# Patient Record
Sex: FEMALE | Race: ASIAN | NOT HISPANIC OR LATINO | URBAN - METROPOLITAN AREA
[De-identification: names, ages, dates, MRNs, and addresses within clinical notes are randomized per-mention and may not be internally consistent; named-entity substitution may affect disease eponyms.]

---

## 2018-10-01 ENCOUNTER — EMERGENCY (EMERGENCY)
Facility: HOSPITAL | Age: 63
LOS: 1 days | Discharge: HOME | End: 2018-10-01
Attending: EMERGENCY MEDICINE
Payer: MEDICAID

## 2018-10-01 VITALS
RESPIRATION RATE: 14 BRPM | DIASTOLIC BLOOD PRESSURE: 93 MMHG | TEMPERATURE: 98 F | SYSTOLIC BLOOD PRESSURE: 141 MMHG | OXYGEN SATURATION: 98 % | HEART RATE: 72 BPM

## 2018-10-01 VITALS
HEART RATE: 59 BPM | SYSTOLIC BLOOD PRESSURE: 98 MMHG | RESPIRATION RATE: 17 BRPM | TEMPERATURE: 98 F | OXYGEN SATURATION: 100 % | DIASTOLIC BLOOD PRESSURE: 66 MMHG

## 2018-10-01 DIAGNOSIS — N83.202 UNSPECIFIED OVARIAN CYST, LEFT SIDE: ICD-10-CM

## 2018-10-01 LAB
ALBUMIN SERPL ELPH-MCNC: 4 G/DL — SIGNIFICANT CHANGE UP (ref 3.3–5)
ALP SERPL-CCNC: 74 U/L — SIGNIFICANT CHANGE UP (ref 40–120)
ALT FLD-CCNC: 17 U/L — SIGNIFICANT CHANGE UP (ref 10–45)
ANION GAP SERPL CALC-SCNC: 10 MMOL/L — SIGNIFICANT CHANGE UP (ref 5–17)
APPEARANCE UR: CLEAR — SIGNIFICANT CHANGE UP
AST SERPL-CCNC: 18 U/L — SIGNIFICANT CHANGE UP (ref 10–40)
BACTERIA # UR AUTO: NEGATIVE — SIGNIFICANT CHANGE UP
BASOPHILS # BLD AUTO: 0 K/UL — SIGNIFICANT CHANGE UP (ref 0–0.2)
BASOPHILS NFR BLD AUTO: 0.8 % — SIGNIFICANT CHANGE UP (ref 0–2)
BILIRUB SERPL-MCNC: 0.4 MG/DL — SIGNIFICANT CHANGE UP (ref 0.2–1.2)
BILIRUB UR-MCNC: NEGATIVE — SIGNIFICANT CHANGE UP
BUN SERPL-MCNC: 18 MG/DL — SIGNIFICANT CHANGE UP (ref 7–23)
CALCIUM SERPL-MCNC: 9.3 MG/DL — SIGNIFICANT CHANGE UP (ref 8.4–10.5)
CHLORIDE SERPL-SCNC: 105 MMOL/L — SIGNIFICANT CHANGE UP (ref 96–108)
CO2 SERPL-SCNC: 26 MMOL/L — SIGNIFICANT CHANGE UP (ref 22–31)
COLOR SPEC: COLORLESS — SIGNIFICANT CHANGE UP
CREAT SERPL-MCNC: 0.68 MG/DL — SIGNIFICANT CHANGE UP (ref 0.5–1.3)
DIFF PNL FLD: NEGATIVE — SIGNIFICANT CHANGE UP
EOSINOPHIL # BLD AUTO: 0.1 K/UL — SIGNIFICANT CHANGE UP (ref 0–0.5)
EOSINOPHIL NFR BLD AUTO: 2.8 % — SIGNIFICANT CHANGE UP (ref 0–6)
EPI CELLS # UR: 3 /HPF — SIGNIFICANT CHANGE UP
GLUCOSE SERPL-MCNC: 118 MG/DL — HIGH (ref 70–99)
GLUCOSE UR QL: NEGATIVE — SIGNIFICANT CHANGE UP
HCT VFR BLD CALC: 41.5 % — SIGNIFICANT CHANGE UP (ref 34.5–45)
HGB BLD-MCNC: 13.7 G/DL — SIGNIFICANT CHANGE UP (ref 11.5–15.5)
HYALINE CASTS # UR AUTO: 0 /LPF — SIGNIFICANT CHANGE UP (ref 0–2)
KETONES UR-MCNC: NEGATIVE — SIGNIFICANT CHANGE UP
LEUKOCYTE ESTERASE UR-ACNC: ABNORMAL
LIDOCAIN IGE QN: 42 U/L — SIGNIFICANT CHANGE UP (ref 7–60)
LYMPHOCYTES # BLD AUTO: 2 K/UL — SIGNIFICANT CHANGE UP (ref 1–3.3)
LYMPHOCYTES # BLD AUTO: 40 % — SIGNIFICANT CHANGE UP (ref 13–44)
MCHC RBC-ENTMCNC: 31.8 PG — SIGNIFICANT CHANGE UP (ref 27–34)
MCHC RBC-ENTMCNC: 33.1 GM/DL — SIGNIFICANT CHANGE UP (ref 32–36)
MCV RBC AUTO: 96.1 FL — SIGNIFICANT CHANGE UP (ref 80–100)
MONOCYTES # BLD AUTO: 0.4 K/UL — SIGNIFICANT CHANGE UP (ref 0–0.9)
MONOCYTES NFR BLD AUTO: 8.2 % — SIGNIFICANT CHANGE UP (ref 2–14)
NEUTROPHILS # BLD AUTO: 2.5 K/UL — SIGNIFICANT CHANGE UP (ref 1.8–7.4)
NEUTROPHILS NFR BLD AUTO: 48.2 % — SIGNIFICANT CHANGE UP (ref 43–77)
NITRITE UR-MCNC: NEGATIVE — SIGNIFICANT CHANGE UP
PH UR: 7 — SIGNIFICANT CHANGE UP (ref 5–8)
PLATELET # BLD AUTO: 223 K/UL — SIGNIFICANT CHANGE UP (ref 150–400)
POTASSIUM SERPL-MCNC: 3.9 MMOL/L — SIGNIFICANT CHANGE UP (ref 3.5–5.3)
POTASSIUM SERPL-SCNC: 3.9 MMOL/L — SIGNIFICANT CHANGE UP (ref 3.5–5.3)
PROT SERPL-MCNC: 7.1 G/DL — SIGNIFICANT CHANGE UP (ref 6–8.3)
PROT UR-MCNC: NEGATIVE — SIGNIFICANT CHANGE UP
RBC # BLD: 4.32 M/UL — SIGNIFICANT CHANGE UP (ref 3.8–5.2)
RBC # FLD: 12 % — SIGNIFICANT CHANGE UP (ref 10.3–14.5)
RBC CASTS # UR COMP ASSIST: 2 /HPF — SIGNIFICANT CHANGE UP (ref 0–4)
SODIUM SERPL-SCNC: 141 MMOL/L — SIGNIFICANT CHANGE UP (ref 135–145)
SP GR SPEC: 1.01 — SIGNIFICANT CHANGE UP (ref 1.01–1.02)
TROPONIN T, HIGH SENSITIVITY RESULT: <6 NG/L — SIGNIFICANT CHANGE UP (ref 0–51)
UROBILINOGEN FLD QL: NEGATIVE — SIGNIFICANT CHANGE UP
WBC # BLD: 5.1 K/UL — SIGNIFICANT CHANGE UP (ref 3.8–10.5)
WBC # FLD AUTO: 5.1 K/UL — SIGNIFICANT CHANGE UP (ref 3.8–10.5)
WBC UR QL: 13 /HPF — HIGH (ref 0–5)

## 2018-10-01 PROCEDURE — 93005 ELECTROCARDIOGRAM TRACING: CPT

## 2018-10-01 PROCEDURE — 93975 VASCULAR STUDY: CPT

## 2018-10-01 PROCEDURE — 81001 URINALYSIS AUTO W/SCOPE: CPT

## 2018-10-01 PROCEDURE — 76856 US EXAM PELVIC COMPLETE: CPT

## 2018-10-01 PROCEDURE — 74176 CT ABD & PELVIS W/O CONTRAST: CPT | Mod: 26

## 2018-10-01 PROCEDURE — 93975 VASCULAR STUDY: CPT | Mod: 26

## 2018-10-01 PROCEDURE — 80053 COMPREHEN METABOLIC PANEL: CPT

## 2018-10-01 PROCEDURE — 76856 US EXAM PELVIC COMPLETE: CPT | Mod: 26,59

## 2018-10-01 PROCEDURE — 99284 EMERGENCY DEPT VISIT MOD MDM: CPT | Mod: 25

## 2018-10-01 PROCEDURE — 76830 TRANSVAGINAL US NON-OB: CPT | Mod: 26

## 2018-10-01 PROCEDURE — 85027 COMPLETE CBC AUTOMATED: CPT

## 2018-10-01 PROCEDURE — 84484 ASSAY OF TROPONIN QUANT: CPT

## 2018-10-01 PROCEDURE — 93010 ELECTROCARDIOGRAM REPORT: CPT

## 2018-10-01 PROCEDURE — 74176 CT ABD & PELVIS W/O CONTRAST: CPT

## 2018-10-01 PROCEDURE — 83690 ASSAY OF LIPASE: CPT

## 2018-10-01 PROCEDURE — 96374 THER/PROPH/DIAG INJ IV PUSH: CPT

## 2018-10-01 PROCEDURE — 96375 TX/PRO/DX INJ NEW DRUG ADDON: CPT

## 2018-10-01 RX ORDER — ONDANSETRON 8 MG/1
4 TABLET, FILM COATED ORAL ONCE
Qty: 0 | Refills: 0 | Status: COMPLETED | OUTPATIENT
Start: 2018-10-01 | End: 2018-10-01

## 2018-10-01 RX ORDER — SODIUM CHLORIDE 9 MG/ML
1000 INJECTION INTRAMUSCULAR; INTRAVENOUS; SUBCUTANEOUS ONCE
Qty: 0 | Refills: 0 | Status: COMPLETED | OUTPATIENT
Start: 2018-10-01 | End: 2018-10-01

## 2018-10-01 RX ORDER — KETOROLAC TROMETHAMINE 30 MG/ML
30 SYRINGE (ML) INJECTION ONCE
Qty: 0 | Refills: 0 | Status: DISCONTINUED | OUTPATIENT
Start: 2018-10-01 | End: 2018-10-01

## 2018-10-01 RX ORDER — ACETAMINOPHEN 500 MG
1000 TABLET ORAL ONCE
Qty: 0 | Refills: 0 | Status: COMPLETED | OUTPATIENT
Start: 2018-10-01 | End: 2018-10-01

## 2018-10-01 RX ADMIN — Medication 1000 MILLIGRAM(S): at 06:15

## 2018-10-01 RX ADMIN — ONDANSETRON 4 MILLIGRAM(S): 8 TABLET, FILM COATED ORAL at 05:50

## 2018-10-01 RX ADMIN — SODIUM CHLORIDE 1000 MILLILITER(S): 9 INJECTION INTRAMUSCULAR; INTRAVENOUS; SUBCUTANEOUS at 06:18

## 2018-10-01 RX ADMIN — Medication 1000 MILLIGRAM(S): at 05:55

## 2018-10-01 RX ADMIN — Medication 30 MILLIGRAM(S): at 13:11

## 2018-10-01 NOTE — ED PROVIDER NOTE - NSFOLLOWUPINSTRUCTIONS_ED_ALL_ED_FT
Follow-up with your Primary Care Physician within 24-48 hours.  Please return to the Emergency Department immediately for any new, worsening or concerning symptoms; specifically those included in the attached information brochure.  Copy of your lab work, imaging and/or other testing performed in the ER is attached along with your discharge paperwork.  Please take this to your Primary Care Physician for further discussion, evaluation and comparison with your prior blood work results.     Can use Tylenol (up to 1000mg every 6 hours) or Ibuprofen (up to 600mg every 6 hours) as per package directions for pain - use only as needed.  These are over-the-counter medications - please respect the warnings on the label.

## 2018-10-01 NOTE — ED PROVIDER NOTE - PHYSICAL EXAMINATION
GEN: Well appearing, well nourished, in no apparent distress, comfortable in bed   HEAD: NCAT  HEENT: PERRL, Airway patent, MMM,  neck supple, no LAD  LUNG: CTAB, no adventitious sounds, no retractions, no nasal flaring  CV: RRR, no murmurs,   Abd: soft, NTND, no rebound or guarding, BS+ in all quadrants, no CVAT  MSK: WWP, Pulses 2+ in extremities, No edema, no visible deformities  Neuro:  CN II-XII in tact. AAOx3, Ambulatory with stable gait.   Skin: Warm and dry, no evidence of rash  Psych: normal mood and affect GEN: Well appearing, well nourished, in no apparent distress, comfortable in bed   HEAD: NCAT  HEENT: PERRL, Airway patent, MMM,  neck supple, no LAD  LUNG: CTAB, no adventitious sounds, no retractions, no nasal flaring  CV: RRR, no murmurs,   Abd: soft, no pulsating abdominal mass, TTP LLQ, no rebound or guarding, BS+ in all quadrants, CVAT on L flank, neg gallagher   MSK: WWP, Pulses 2+ in extremities, No edema, no visible deformities  Neuro:  CN II-XII in tact. AAOx3, Ambulatory with stable gait. neg straight leg test  Skin: Warm and dry, no evidence of rash  Psych: normal mood and affect

## 2018-10-01 NOTE — ED PROVIDER NOTE - PROGRESS NOTE DETAILS
CTAP shows a 6 x 4.3 x 4.8 cm left ovarian cyst. Clinical correlation for left ovarian   torsion is recommended. US pelvic ordered to r/o torsion. CTAP shows a 6 x 4.3 x 4.8 cm left ovarian cyst. Clinical correlation for left ovarian   torsion is recommended. US pelvic ordered to r/o torsion. Explained to pt via video mandarin  : Pippa [id number] **ATTENDING ADDENDUM (Dr. Vin Spann): patient serially evaluated throughout ED course by ED team. Awaiting final recommendations of OB-GYN team (likely discharge home with close outpatient followup for ovarian cyst). NO evidence of acute torsion at this time. Anticipatory guidance provided by ED team throughout ED course. **ATTENDING ADDENDUM (Dr. Vin Spann): patient serially evaluated throughout ED course by ED team. ED diagnostics up to this time acknowledged, reviewed and noted. Recommendations by OB-GYN reviewed. Agree with discharge home with close outpatient followup with primary care physician/provider.  Anticipatory guidance provided by ED team.

## 2018-10-01 NOTE — CONSULT NOTE ADULT - SUBJECTIVE AND OBJECTIVE BOX
Gyn Consult Note  GAUDENCIO CASTLE  63y  Female [id number]    HPI: 64y/o  Mandarin speaking female presented to ED with back pain. CT with ovarian cyst. GYN consulted for r/o ovarian torsion.     History obtained from patient using [employer],  #[id number].    LMP: 7-8 years ago    Patient reports new onset back/flank pain and abdominal bloating x2 days. Pt originally though it was 2/2 exercise regimen. Took two Aleve last night prior to sleeping, woke up in pain, decided to come to hospital. Pain is worse with standing, difficult to walk to times. Pain worse with deep inspiration. No active vaginal bleeding or discharge though patient reports infrequent vaginal bleeding after intercourse. Last BM 2 days ago. Intermittent constipation. No diarrhea. No urinary symptoms.     POB: C/S x1,  x3    Pgyn: Has not seen gynecologist since moving to [place] 1 [place] ago. Hx fibroids, (possible myomectomy), denies cysts, abnormal pap smears, possible STI (unknown type) 8 years ago, presented with itchiness, treated    Home meds: allergy pill QD    Allergies: No Known Allergies    PAST MEDICAL & SURGICAL HISTORY:  Hx of biliary colic  C/S x1, possibly myomectomy    Vital Signs Last 24 Hrs  T(C): 36.5 (01 Oct 2018 10:03), Max: 36.7 (01 Oct 2018 03:45)  T(F): 97.7 (01 Oct 2018 10:03), Max: 98.1 (01 Oct 2018 03:45)  HR: 54 (01 Oct 2018 10:03) (52 - 72)  BP: 102/68 (01 Oct 2018 10:03) (97/62 - 141/93)  BP(mean): --  RR: 17 (01 Oct 2018 10:03) (14 - 18)  SpO2: 97% (01 Oct 2018 10:03) (96% - 98%)    Physical Exam:   General: sitting comfortably in bed, NAD   CV: RRR  Lungs: CTAB   Abd: Soft, non-tender, non-distended.  Bowel sounds present.    :  No bleeding. External labia wnl  Bimanual exam with no cervical motion tenderness, uterus wnl, adnexa non palpable b/l    LABS:                            13.7   5.1   )-----------( 223      ( 01 Oct 2018 06:02 )             41.5     10-01    141  |  105  |  18  ----------------------------<  118<H>  3.9   |  26  |  0.68    Ca    9.3      01 Oct 2018 06:02    TPro  7.1  /  Alb  4.0  /  TBili  0.4  /  DBili  x   /  AST  18  /  ALT  17  /  AlkPhos  74  10-01    I&O's Detail      Urinalysis Basic - ( 01 Oct 2018 11:53 )    Color: Colorless / Appearance: Clear / S.010 / pH: x  Gluc: x / Ketone: Negative  / Bili: Negative / Urobili: Negative   Blood: x / Protein: Negative / Nitrite: Negative   Leuk Esterase: Large / RBC: 2 /hpf / WBC 13 /hpf   Sq Epi: x / Non Sq Epi: 3 /hpf / Bacteria: Negative        RADIOLOGY & ADDITIONAL STUDIES:      EXAM:  CT ABDOMEN AND PELVIS                        PROCEDURE DATE:  10/01/2018      FINDINGS:  LOWER CHEST: Bibasilar subsegmental atelectasis.  LIVER: Within normal limits.  BILE DUCTS: Normal caliber.  GALLBLADDER: Within normal limits.  SPLEEN: Within normal limits.  PANCREAS: Within normal limits.  ADRENALS: Within normal limits.  KIDNEYS/URETERS: Within normal limits.  BLADDER: Within normal limits.  REPRODUCTIVE ORGANS: 6 x 4.3 x 4.8 cm left ovarian cyst. The right ovary   and uterus are unremarkable.  BOWEL: No bowel obstruction. The appendix is not distinctly visualized.  PERITONEUM: No pneumoperitoneum.  VESSELS:  Within normal limits.  RETROPERITONEUM: No lymphadenopathy.    ABDOMINAL WALL: Within normal limits.  BONES: Mild degenerative changes of the spine.    IMPRESSION:  6 x 4.3 x 4.8 cm left ovarian cyst. Clinical correlation for left ovarian   torsion is recommended.    EXAM:  US PELVIC COMPLETE                        EXAM:  US DPLX PELVIC                        PROCEDURE DATE:  10/01/2018    INTERPRETATION:  CLINICAL INFORMATION: Left lower quadrant pain.  LMP: Postmenopausal.  COMPARISON: CT abdomen and pelvis 10/1/2018.    FINDINGS:  Uterus: The uterus is retroflexed and measures 4.1x 2.6 x 3.3 cm. Within   normal limits.  Endometrium: 4 mm. Within normal limits.  Right ovary: 1.3 x 0.9 x 0.8 cm. Within normal limits. Arterial and   venous Doppler waveforms are visualized.  Left ovary: Left ovary measures 1.8 x 1.8 x 1.0 cm and contains a simple   appearing cyst measuring 6.4 x 4.7 x 5.2 cm. Within normal limits.   Arterial and venous Doppler waveforms are visualized.  Fluid: None.    IMPRESSION:  Simple left ovarian cyst measuring 6.4 cm. Follow-up sonogram in one year   is recommended to assess for stability. No sonographic evidence of ovarian  torsion at the time of this examination. Gyn Consult Note  GAUDENCIO CASTLE  63y  Female [id number]    HPI: 62y/o  Mandarin speaking female presented to ED with back pain. CT with ovarian cyst. GYN consulted for r/o ovarian torsion.     History obtained from patient using [employer],  #[id number].    LMP: 7-8 years ago    Patient reports new onset bilateral back/flank pain and abdominal bloating x2 days. Pt originally though it was 2/2 exercise regimen. Took two Aleve last night prior to sleeping, woke up in pain, decided to come to hospital. Patient denies abdominal pain. Back pain is worse with standing, difficult to walk to times. Pain worse with deep inspiration. No active vaginal bleeding or discharge though patient reports infrequent vaginal bleeding after intercourse. Last BM 2 days ago. Intermittent constipation. No diarrhea. No urinary symptoms.     POB: C/S x1,  x3    Pgyn: Has not seen gynecologist since moving to [place] 1 [place] ago. Hx fibroids, (possible myomectomy), denies cysts, abnormal pap smears, possible STI (unknown type) 8 years ago, presented with itchiness, treated    Home meds: allergy pill QD    Allergies: No Known Allergies    PAST MEDICAL & SURGICAL HISTORY:  Hx of biliary colic  C/S x1, possibly myomectomy    Vital Signs Last 24 Hrs  T(C): 36.5 (01 Oct 2018 10:03), Max: 36.7 (01 Oct 2018 03:45)  T(F): 97.7 (01 Oct 2018 10:03), Max: 98.1 (01 Oct 2018 03:45)  HR: 54 (01 Oct 2018 10:03) (52 - 72)  BP: 102/68 (01 Oct 2018 10:03) (97/62 - 141/93)  BP(mean): --  RR: 17 (01 Oct 2018 10:03) (14 - 18)  SpO2: 97% (01 Oct 2018 10:03) (96% - 98%)    Physical Exam:   Gen: sitting comfortably in bed, NAD   Cardio: RRR  Pulm: CTAB   Abd: Soft, non-tender, non-distended.  Bowel sounds present.    :  No bleeding. External labia wnl. Bimanual exam with no cervical motion tenderness, uterus wnl, adnexa non palpable b/l    LABS:                 13.7   5.1   )-----------( 223      ( 01 Oct 2018 06:02 )             41.5     10-01    141  |  105  |  18  ----------------------------<  118<H>  3.9   |  26  |  0.68    Ca    9.3      01 Oct 2018 06:02    TPro  7.1  /  Alb  4.0  /  TBili  0.4  /  DBili  x   /  AST  18  /  ALT  17  /  AlkPhos  74  10-01    I&O's Detail      Urinalysis Basic - ( 01 Oct 2018 11:53 )    Color: Colorless / Appearance: Clear / S.010 / pH: x  Gluc: x / Ketone: Negative  / Bili: Negative / Urobili: Negative   Blood: x / Protein: Negative / Nitrite: Negative   Leuk Esterase: Large / RBC: 2 /hpf / WBC 13 /hpf   Sq Epi: x / Non Sq Epi: 3 /hpf / Bacteria: Negative    RADIOLOGY & ADDITIONAL STUDIES:      EXAM:  CT ABDOMEN AND PELVIS                        PROCEDURE DATE:  10/01/2018      FINDINGS:  LOWER CHEST: Bibasilar subsegmental atelectasis.  LIVER: Within normal limits.  BILE DUCTS: Normal caliber.  GALLBLADDER: Within normal limits.  SPLEEN: Within normal limits.  PANCREAS: Within normal limits.  ADRENALS: Within normal limits.  KIDNEYS/URETERS: Within normal limits.  BLADDER: Within normal limits.  REPRODUCTIVE ORGANS: 6 x 4.3 x 4.8 cm left ovarian cyst. The right ovary   and uterus are unremarkable.  BOWEL: No bowel obstruction. The appendix is not distinctly visualized.  PERITONEUM: No pneumoperitoneum.  VESSELS:  Within normal limits.  RETROPERITONEUM: No lymphadenopathy.    ABDOMINAL WALL: Within normal limits.  BONES: Mild degenerative changes of the spine.    IMPRESSION:  6 x 4.3 x 4.8 cm left ovarian cyst. Clinical correlation for left ovarian   torsion is recommended.    EXAM:  US PELVIC COMPLETE                        EXAM:  US DPLX PELVIC                        PROCEDURE DATE:  10/01/2018    INTERPRETATION:  CLINICAL INFORMATION: Left lower quadrant pain.  LMP: Postmenopausal.  COMPARISON: CT abdomen and pelvis 10/1/2018.    FINDINGS:  Uterus: The uterus is retroflexed and measures 4.1x 2.6 x 3.3 cm. Within   normal limits.  Endometrium: 4 mm. Within normal limits.  Right ovary: 1.3 x 0.9 x 0.8 cm. Within normal limits. Arterial and   venous Doppler waveforms are visualized.  Left ovary: Left ovary measures 1.8 x 1.8 x 1.0 cm and contains a simple   appearing cyst measuring 6.4 x 4.7 x 5.2 cm. Within normal limits.   Arterial and venous Doppler waveforms are visualized.  Fluid: None.    IMPRESSION:  Simple left ovarian cyst measuring 6.4 cm. Follow-up sonogram in one year   is recommended to assess for stability. No sonographic evidence of ovarian  torsion at the time of this examination. Gyn Consult Note  GAUDENCIO CASTLE  63y  Female [id number]    HPI: 62y/o  Mandarin speaking female presented to ED with back pain. CT with ovarian cyst. GYN consulted for r/o ovarian torsion.     History obtained from patient using [employer],  #[id number].    LMP: 7-8 years ago    Patient reports new onset back pain and abdominal bloating x2 days. Pt originally though it was 2/2 exercise regimen. Took two Aleve last night prior to sleeping, woke up in pain, decided to come to hospital. Patient denies abdominal pain. Back pain is worse with standing, difficult to walk to times. Pain worse with deep inspiration. No active vaginal bleeding or discharge though patient reports infrequent vaginal bleeding after intercourse. Last BM 2 days ago. Intermittent constipation. No diarrhea. No urinary symptoms.     POB: C/S x1,  x3    Pgyn: Has not seen gynecologist since moving 1 year ago. Hx fibroids, (possible myomectomy), denies cysts, abnormal pap smears, possible STI (unknown type) 8 years ago, presented with itchiness, treated    Home meds: allergy pill QD    Allergies: No Known Allergies    PAST MEDICAL & SURGICAL HISTORY:  Hx of biliary colic  C/S x1, possibly myomectomy    Vital Signs Last 24 Hrs  T(C): 36.5 (01 Oct 2018 10:03), Max: 36.7 (01 Oct 2018 03:45)  T(F): 97.7 (01 Oct 2018 10:03), Max: 98.1 (01 Oct 2018 03:45)  HR: 54 (01 Oct 2018 10:03) (52 - 72)  BP: 102/68 (01 Oct 2018 10:03) (97/62 - 141/93)  BP(mean): --  RR: 17 (01 Oct 2018 10:03) (14 - 18)  SpO2: 97% (01 Oct 2018 10:03) (96% - 98%)    Physical Exam:   Gen: sitting comfortably in bed, NAD   Cardio: RRR  Pulm: CTAB   Abd: Soft, non-tender, non-distended.  Bowel sounds present.    :  No bleeding. External labia wnl. Bimanual exam with no cervical motion tenderness, uterus wnl, adnexa non palpable b/l    LABS:                 13.7   5.1   )-----------( 223      ( 01 Oct 2018 06:02 )             41.5     10-01    141  |  105  |  18  ----------------------------<  118<H>  3.9   |  26  |  0.68    Ca    9.3      01 Oct 2018 06:02    TPro  7.1  /  Alb  4.0  /  TBili  0.4  /  DBili  x   /  AST  18  /  ALT  17  /  AlkPhos  74  10-01    I&O's Detail      Urinalysis Basic - ( 01 Oct 2018 11:53 )    Color: Colorless / Appearance: Clear / S.010 / pH: x  Gluc: x / Ketone: Negative  / Bili: Negative / Urobili: Negative   Blood: x / Protein: Negative / Nitrite: Negative   Leuk Esterase: Large / RBC: 2 /hpf / WBC 13 /hpf   Sq Epi: x / Non Sq Epi: 3 /hpf / Bacteria: Negative    RADIOLOGY & ADDITIONAL STUDIES:      EXAM:  CT ABDOMEN AND PELVIS                        PROCEDURE DATE:  10/01/2018      FINDINGS:  LOWER CHEST: Bibasilar subsegmental atelectasis.  LIVER: Within normal limits.  BILE DUCTS: Normal caliber.  GALLBLADDER: Within normal limits.  SPLEEN: Within normal limits.  PANCREAS: Within normal limits.  ADRENALS: Within normal limits.  KIDNEYS/URETERS: Within normal limits.  BLADDER: Within normal limits.  REPRODUCTIVE ORGANS: 6 x 4.3 x 4.8 cm left ovarian cyst. The right ovary   and uterus are unremarkable.  BOWEL: No bowel obstruction. The appendix is not distinctly visualized.  PERITONEUM: No pneumoperitoneum.  VESSELS:  Within normal limits.  RETROPERITONEUM: No lymphadenopathy.    ABDOMINAL WALL: Within normal limits.  BONES: Mild degenerative changes of the spine.    IMPRESSION:  6 x 4.3 x 4.8 cm left ovarian cyst. Clinical correlation for left ovarian   torsion is recommended.    EXAM:  US PELVIC COMPLETE                        EXAM:  US DPLX PELVIC                        PROCEDURE DATE:  10/01/2018    INTERPRETATION:  CLINICAL INFORMATION: Left lower quadrant pain.  LMP: Postmenopausal.  COMPARISON: CT abdomen and pelvis 10/1/2018.    FINDINGS:  Uterus: The uterus is retroflexed and measures 4.1x 2.6 x 3.3 cm. Within   normal limits.  Endometrium: 4 mm. Within normal limits.  Right ovary: 1.3 x 0.9 x 0.8 cm. Within normal limits. Arterial and   venous Doppler waveforms are visualized.  Left ovary: Left ovary measures 1.8 x 1.8 x 1.0 cm and contains a simple   appearing cyst measuring 6.4 x 4.7 x 5.2 cm. Within normal limits.   Arterial and venous Doppler waveforms are visualized.  Fluid: None.    IMPRESSION:  Simple left ovarian cyst measuring 6.4 cm. Follow-up sonogram in one year   is recommended to assess for stability. No sonographic evidence of ovarian  torsion at the time of this examination.

## 2018-10-01 NOTE — ED PROVIDER NOTE - OBJECTIVE STATEMENT
63F recent travel from [place] presents with abdominal and back pain for 2 days. Abdominal moderate bloating type of pain, constant non-radiating, diffuse, especially painful in LLQ associated with some nausea and 1 episode of diarrhea, no blood. Denies constipation, CP, sob, HA, dizziness, syncope, palpitations. Also endorses a back pain that started 2 days as well, non-radiating, stabbing, L >R, no radiculopathy, no incontinence, no saddle anesthesia, no F/C. 63F recent travel from [place] and history of acute cholecystitis presents with abdominal and back pain for 2 days. Abdominal moderate bloating type of pain, constant non-radiating, not related to food, diffuse, especially painful in LLQ associated with some nausea and 1 episode of diarrhea, no blood. Denies constipation, CP, sob, HA, dizziness, syncope, palpitations. Also endorses a back pain that started 2 days as well, non-radiating, stabbing, L >R, no radiculopathy, no incontinence, no saddle anesthesia, no F/C.

## 2018-10-01 NOTE — ED ADULT NURSE REASSESSMENT NOTE - NS ED NURSE REASSESS COMMENT FT1
Pt received from WILDA Kumar in [place], alert and oriented times three, breathing spontaneous, unlabored without distress on room air, NAD, pt taken to [place]

## 2018-10-01 NOTE — CONSULT NOTE ADULT - ASSESSMENT
64y/o  Mandarin speaking female presented to ED with back pain. CT with 6 x 4.3 x 4.8cm left ovarian cyst. GYN consulted for r/o ovarian torsion. Ultrasound showed simple left ovarian cyst, dopplers wnl, no sonographic evidence of torsion. Patient endorsing back pain and abdominal distention but denies abdominal pain. Patent without further gynecologic complaints. Abdominal and pelvic exams benign.

## 2018-10-01 NOTE — CONSULT NOTE ADULT - PROBLEM SELECTOR RECOMMENDATION 9
- No concern for ovarian torsion at this time, no acute gynecologic intervention required  - Recommend outpatient follow up with GYN for follow up imaging of simple cyst                                [address]  	                [place] [address]                                [phone]    Elena Avila PGY1  Patient seen and examined by TIFFANIE Schwarz PGY2  d/w Dr. Villa

## 2018-10-01 NOTE — ED PROVIDER NOTE - MEDICAL DECISION MAKING DETAILS
Bloating abdominal pain and back pain in 63F with hx acute cholecystitis. CT AP r.o diverticulosis but also consider cholecystitis, nephrolithiasis, pain management, basic labs. consider RUQ sono. no red signs for AAA - no syncope, abdominal mass, HTN or smoking history. Bloating abdominal pain and back pain in 63F with hx acute cholecystitis. CT AP r.o diverticulosis but also consider nephrolithiasis, pain management, basic labs. no red signs for AAA - no syncope, abdominal mass, HTN or smoking history.

## 2018-10-01 NOTE — ED PROVIDER NOTE - PLAN OF CARE
ATTENDING ADDENDUM (Dr. Vin Spann): Goals of care include resolution of emergent/urgent symptoms and concerns, and restoration to baseline level of homeostasis.

## 2018-10-01 NOTE — ED ADULT NURSE REASSESSMENT NOTE - NS ED NURSE REASSESS COMMENT FT1
Received report from WILDA López. Patient resting comfortably in bed at this time, awaiting OB/GYN consult.

## 2018-10-01 NOTE — ED PROVIDER NOTE - ATTENDING CONTRIBUTION TO CARE
Patient is a 64 yo F here for evaluation of left lower abdominal pain x 2 days. + nausea + nonbloody diarrhea. No fevers. Pain radiates to back. Denies urinary symptoms.  VS noted  Gen. no acute distress, Non toxic   HEENT: EOMI, mmm  Lungs: CTAB/L no C/ W /R   CVS: RRR   Abd; Soft mild tenderness in LLQ, no rebound, no CVA tenderness b/l  Ext: no edema  Skin: no rash  Neuro awake, alert, answering questions appropriately, non focal clear speech  a/p: LLQ pain - concern for diverticulitis. Will check labs, u/a to r/o UTI/pyelo.   - Karen MONZON

## 2018-10-01 NOTE — ED ADULT NURSE NOTE - NSIMPLEMENTINTERV_GEN_ALL_ED
Implemented All Universal Safety Interventions:  [place] to call system. Call bell, personal items and telephone within reach. Instruct patient to call for assistance. Room bathroom lighting operational. Non-slip footwear when patient is off stretcher. Physically safe environment: no spills, clutter or unnecessary equipment. Stretcher in lowest position, wheels locked, appropriate side rails in place.

## 2018-10-01 NOTE — ED PROVIDER NOTE - NS ED ROS FT
Constitutional: no fevers, no chills.  Eyes: no visual changes.  Ears: no ear drainage, no ear pain.  Nose: no nasal congestion.  Mouth/Throat: no sore throat.  Cardiovascular: no chest pain.  Respiratory: no shortness of breath, no wheezing, no cough  Gastrointestinal: no nausea, no vomiting, no diarrhea, no abdominal pain.  MSK: no flank pain, no back pain.  Genitourinary: no dysuria, no hematuria.  Skin: no rashes.  Neuro: no headache,   Psychiatric: no known mental health issues. Constitutional: no fevers, no chills.  Eyes: no visual changes.  Ears: no ear drainage, no ear pain.  Nose: no nasal congestion.  Mouth/Throat: no sore throat.  Cardiovascular: no chest pain.  Respiratory: no shortness of breath, no wheezing, no cough  Gastrointestinal: +nausea, no vomiting, +diarrhea, +abdominal pain.  MSK: +L flank pain, +back pain.  Genitourinary: no dysuria, no hematuria.  Skin: no rashes.  Neuro: no headache,   Psychiatric: no known mental health issues.

## 2019-03-05 PROBLEM — Z00.00 ENCOUNTER FOR PREVENTIVE HEALTH EXAMINATION: Status: ACTIVE | Noted: 2019-03-05

## 2019-04-03 ENCOUNTER — APPOINTMENT (OUTPATIENT)
Dept: OBGYN | Facility: CLINIC | Age: 64
End: 2019-04-03

## 2019-04-03 ENCOUNTER — APPOINTMENT (OUTPATIENT)
Dept: OBGYN | Facility: CLINIC | Age: 64
End: 2019-04-03
Payer: COMMERCIAL

## 2019-04-03 VITALS
BODY MASS INDEX: 26.53 KG/M2 | SYSTOLIC BLOOD PRESSURE: 110 MMHG | DIASTOLIC BLOOD PRESSURE: 60 MMHG | WEIGHT: 169 LBS | HEIGHT: 67 IN

## 2019-04-03 VITALS
SYSTOLIC BLOOD PRESSURE: 109 MMHG | HEART RATE: 70 BPM | WEIGHT: 165 LBS | BODY MASS INDEX: 25.9 KG/M2 | DIASTOLIC BLOOD PRESSURE: 60 MMHG | HEIGHT: 67 IN | OXYGEN SATURATION: 98 % | TEMPERATURE: 97.3 F

## 2019-04-03 DIAGNOSIS — N83.202 UNSPECIFIED OVARIAN CYST, LEFT SIDE: ICD-10-CM

## 2019-04-03 DIAGNOSIS — Z01.419 ENCOUNTER FOR GYNECOLOGICAL EXAMINATION (GENERAL) (ROUTINE) W/OUT ABNORMAL FINDINGS: ICD-10-CM

## 2019-04-03 DIAGNOSIS — Z86.19 PERSONAL HISTORY OF OTHER INFECTIOUS AND PARASITIC DISEASES: ICD-10-CM

## 2019-04-03 PROCEDURE — 99203 OFFICE O/P NEW LOW 30 MIN: CPT

## 2019-04-03 NOTE — PHYSICAL EXAM
[Normal] : cervix [No Bleeding] : there was no active vaginal bleeding [Uterine Adnexae] : were not tender and not enlarged [FreeTextEntry7] : fullness in the left adnexa, pain w/ palpation

## 2019-04-03 NOTE — BEGINNING OF VISIT
[Patient] : patient [] :  [[employer] ] : [employer]   [FreeTextEntry1] : [id number] [FreeTextEntry2] : Barbara

## 2019-04-05 LAB — HPV HIGH+LOW RISK DNA PNL CVX: NOT DETECTED

## 2019-04-08 LAB — CYTOLOGY CVX/VAG DOC THIN PREP: NORMAL

## 2019-04-10 ENCOUNTER — OUTPATIENT (OUTPATIENT)
Dept: OUTPATIENT SERVICES | Facility: HOSPITAL | Age: 64
LOS: 1 days | End: 2019-04-10
Payer: COMMERCIAL

## 2019-04-10 VITALS
RESPIRATION RATE: 16 BRPM | TEMPERATURE: 98 F | DIASTOLIC BLOOD PRESSURE: 70 MMHG | HEART RATE: 67 BPM | WEIGHT: 166.89 LBS | OXYGEN SATURATION: 98 % | SYSTOLIC BLOOD PRESSURE: 105 MMHG | HEIGHT: 67 IN

## 2019-04-10 DIAGNOSIS — N83.202 UNSPECIFIED OVARIAN CYST, LEFT SIDE: ICD-10-CM

## 2019-04-10 DIAGNOSIS — Z98.891 HISTORY OF UTERINE SCAR FROM PREVIOUS SURGERY: Chronic | ICD-10-CM

## 2019-04-10 DIAGNOSIS — Z98.890 OTHER SPECIFIED POSTPROCEDURAL STATES: Chronic | ICD-10-CM

## 2019-04-10 DIAGNOSIS — N83.209 UNSPECIFIED OVARIAN CYST, UNSPECIFIED SIDE: ICD-10-CM

## 2019-04-10 DIAGNOSIS — Z01.818 ENCOUNTER FOR OTHER PREPROCEDURAL EXAMINATION: ICD-10-CM

## 2019-04-10 PROCEDURE — 86900 BLOOD TYPING SEROLOGIC ABO: CPT

## 2019-04-10 PROCEDURE — 36415 COLL VENOUS BLD VENIPUNCTURE: CPT

## 2019-04-10 PROCEDURE — 86850 RBC ANTIBODY SCREEN: CPT

## 2019-04-10 PROCEDURE — 86901 BLOOD TYPING SEROLOGIC RH(D): CPT

## 2019-04-10 PROCEDURE — G0463: CPT

## 2019-04-10 RX ORDER — SODIUM CHLORIDE 9 MG/ML
3 INJECTION INTRAMUSCULAR; INTRAVENOUS; SUBCUTANEOUS EVERY 8 HOURS
Qty: 0 | Refills: 0 | Status: DISCONTINUED | OUTPATIENT
Start: 2019-04-19 | End: 2019-04-27

## 2019-04-10 NOTE — H&P PST ADULT - RS GEN PE MLT RESP DETAILS PC
clear to auscultation bilaterally/respirations non-labored/no rhonchi/airway patent/breath sounds equal/no wheezes/good air movement/no rales

## 2019-04-10 NOTE — H&P PST ADULT - NSICDXPROBLEM_GEN_ALL_CORE_FT
PROBLEM DIAGNOSES  Problem: Ovarian cyst, left  Assessment and Plan: Patient is scheduled for laparoscopic left salpingooophorectomy on 4/19/19.

## 2019-04-10 NOTE — H&P PST ADULT - NEGATIVE GENERAL GENITOURINARY SYMPTOMS
no gas in urine/no urine discoloration/no dysuria/no hematuria/no flank pain R/no bladder infections/no flank pain L/no renal colic/no incontinence/no urinary hesitancy/normal urinary frequency/no nocturia

## 2019-04-10 NOTE — H&P PST ADULT - NEGATIVE OPHTHALMOLOGIC SYMPTOMS
no lacrimation L/no discharge L/no discharge R/no photophobia/no lacrimation R/no blurred vision L/no blurred vision R/no diplopia

## 2019-04-10 NOTE — H&P PST ADULT - NSICDXPASTMEDICALHX_GEN_ALL_CORE_FT
PAST MEDICAL HISTORY:  Fibroids hx of    Ovarian cyst left    Post zoster neuralgia left lower abdomen and left flank    Seasonal allergies     Shingles 09/18

## 2019-04-10 NOTE — H&P PST ADULT - NSANTHOSAYNRD_GEN_A_CORE
No. AGATHA screening performed.  STOP BANG Legend: 0-2 = LOW Risk; 3-4 = INTERMEDIATE Risk; 5-8 = HIGH Risk

## 2019-04-10 NOTE — H&P PST ADULT - HISTORY OF PRESENT ILLNESS
64 years old female presented to [facility] for evaluation before planned surgery, pt was diagnosed with unspecified ovarian cyst and is scheduled for laparoscopic left salpingooophorectomy on 4/19/19.

## 2019-04-10 NOTE — H&P PST ADULT - NEGATIVE ALLERGY TYPES
no indoor environmental allergies/no reactions to animals/no reactions to medicines/no reactions to food/no reactions to insect bites

## 2019-04-10 NOTE — H&P PST ADULT - NEGATIVE ENMT SYMPTOMS
no hearing difficulty/no nasal congestion/no nasal obstruction/no vertigo/no sinus symptoms/no ear pain/no tinnitus/no nasal discharge/no post-nasal discharge

## 2019-04-10 NOTE — H&P PST ADULT - [facility]ANTH[facility]ERD_ENT_A_CORE
Patient : Moise Yarbrough Age: 86 year old Sex: male   MRN: [id number] Encounter Date: 1/12/2017      History     Chief Complaint   Patient presents with   • Leg Swelling     HPI     86-year-old male with a history of CAD presents with swelling has been chronic, appears slightly more swollen over the last few months. His also had a cough that had productive white sputum, seen by his primary care provider's office on January 2 and prescribed Augmentin. No significant change in his symptoms. He denies any chest pain, no new shortness of breath, no dizziness or lightheadedness. He does use respiratory treatments at home which provided some relief. He also reports decreased ability to urinate today. Had a chest x-ray obtained on the January 2 visit showing no evidence of CHF or infiltrate consistent with pneumonia.      ALLERGIES:   Allergen Reactions   • Primidone HEADACHES   • Seasonal Other (See Comments)     Sneezing, watery eyes       Prior to Admission Medications    ALBUTEROL (VENTOLIN) (2.5 MG/3ML) 0.083% NEBULIZER SOLUTION    Take 2.5 mg by nebulization every 6 hours as needed for Wheezing.    ALBUTEROL 108 (90 BASE) MCG/ACT INHALER    Inhale 2 puffs into the lungs every 4 hours as needed for Shortness of Breath or Wheezing.    AMLODIPINE (NORVASC) 10 MG TABLET    Take 10 mg by mouth daily.    ASPIRIN 81 MG TABLET    Take 81 mg by mouth daily.    ATORVASTATIN (LIPITOR) 10 MG TABLET    Take 5 mg by mouth daily.     BUDESONIDE, INHALATION, 90 MCG/ACT AEROSOL POWDER, BREATH ACTIVATED    Inhale 2 puffs into the lungs 2 times daily.    CETIRIZINE (ZYRTEC) 10 MG TABLET    Take 10 mg by mouth as needed for Allergies.    CLOPIDOGREL (PLAVIX) 75 MG TABLET    Take 75 mg by mouth daily.    FERROUS FUMARATE 324 (106 FE) MG TAB        FLUTICASONE (FLOVENT DISKUS) 50 MCG/BLIST AEROSOL POWDER, BREATH ACTIVATED    Spray 2 puffs in each nostril.    GLUCOSAMINE-CHONDROIT-VIT C-MN (GLUCOSAMINE CHONDR 1500 COMPLX PO)    Take 1  capsule by mouth daily.    LEVOTHYROXINE (SYNTHROID, LEVOTHROID) 125 MCG TABLET    Take 125 mcg by mouth daily.    MULTIPLE VITAMINS-MINERALS (MULTIVITAMIN & MINERAL PO)    Take 1 tablet by mouth daily.    NEBULIZERS (COMPRESSOR NEBULIZER) [facility] [place]        RESPIRATORY THERAPY SUPPLIES (NEBULIZER COMPRESSOR) KIT        TERAZOSIN (HYTRIN) 5 MG CAPSULE    Take 15 mg by mouth nightly.    TRIAMCINOLONE (ARISTOCORT) 0.1 % CREAM    Apply 0.1 % topically 2 times daily.       New Prescriptions    AZITHROMYCIN (ZITHROMAX Z-NITZA) 250 MG TABLET    Take 2 tablets (500 mg) by mouth x 1 day then 1 tablet (250 mg) by mouth daily x 4 days.    PREDNISONE (DELTASONE) 20 MG TABLET    Take 2 tablets by mouth daily.       Past Medical History   Diagnosis Date   • Arthritis    • COPD (chronic obstructive pulmonary disease)    • Essential (primary) hypertension    • Malignant neoplasm    • Pneumonia [id number]       Past Surgical History   Procedure Laterality Date   • Hernia repair     • Colon surgery  2001       Family History   Problem Relation Age of Onset   • Heart disease Sister    • Heart disease Brother    • Stroke Maternal Grandmother        Social History   Substance Use Topics   • Smoking status: Former Smoker     Types: Cigarettes     Quit date: 1/1/1962   • Smokeless tobacco: Never Used   • Alcohol use Yes      Comment: socially        Review of Systems   Constitutional: Negative for chills, fatigue and fever.   HENT: Positive for congestion.    Respiratory: Positive for cough. Negative for shortness of breath, wheezing and stridor.    Gastrointestinal: Negative for abdominal pain, diarrhea, nausea and vomiting.   Genitourinary: Negative for dysuria.   Musculoskeletal: Negative for back pain.   Hematological: Negative for adenopathy. Does not bruise/bleed easily.   Psychiatric/Behavioral: Negative for confusion.   All other systems reviewed and are negative.      Physical Exam       ED Triage Vitals   ED Triage Vitals Group      Temp  01/12/17 1446 98.8 °F (37.1 °C)      Pulse 01/12/17 1446 99      Resp 01/12/17 1446 98      BP 01/12/17 1446 141/61      SpO2 01/12/17 1446 95 %      EtCO2 mmHg --       Height 01/12/17 1446 5' 11.5\" (1.816 m)      Weight 01/12/17 1446 202 lb 6.1 oz (91.8 kg)      Weight Scale Used --        Physical Exam   Constitutional: He is oriented to person, place, and time. He appears well-developed. No distress.   HENT:   Head: Normocephalic and atraumatic.   Eyes: Conjunctivae and EOM are normal. Pupils are equal, round, and reactive to light.   Neck: Normal range of motion. Neck supple.   Cardiovascular: Normal rate and regular rhythm.    Pulmonary/Chest: Effort normal. No respiratory distress. He has wheezes. He has no rales. He exhibits no tenderness.   Abdominal: Soft. Bowel sounds are normal.   Musculoskeletal: Normal range of motion. He exhibits edema (2+). He exhibits no tenderness or deformity.   Neurological: He is alert and oriented to person, place, and time.   Skin: Skin is warm. No erythema.   Psychiatric: He has a normal mood and affect.   Nursing note and vitals reviewed.        ED Course     Procedures    Lab Results     Results for orders placed or performed during the hospital encounter of 01/12/17   CBC & Auto Differential   Result Value Ref Range    WBC 16.1 (H) 4.2 - 11.0 K/mcL    RBC 4.03 (L) 4.50 - 5.90 mil/mcL    HGB 11.5 (L) 13.0 - 17.0 g/dL    HCT 36.0 (L) 39.0 - 51.0 %    MCV 89.3 78.0 - 100.0 fl    MCH 28.5 26.0 - 34.0 pg    MCHC 31.9 (L) 32.0 - 36.5 g/dL    RDW-CV 14.8 11.0 - 15.0 %     140 - 450 K/mcL    DIFF TYPE AUTOMATED DIFFERENTIAL     Neutrophil 83 %    LYMPH 9 %    MONO 8 %    EOSIN 0 %    BASO 0 %    Absolute Neutrophil 13.2 (H) 1.8 - 7.7 K/mcL    Absolute Lymph 1.5 1.0 - 4.0 K/mcL    Absolute Mono 1.2 (H) 0.3 - 0.9 K/mcL    Absolute Eos 0.0 (L) 0.1 - 0.5 K/mcL    Absolute Baso 0.0 0.0 - 0.3 K/mcL   Urinalysis & Reflex Micro with Culture if Indicated   Result Value Ref Range     COLOR YELLOW YELLOW    APPEARANCE CLEAR     GLUCOSE(URINE) NEGATIVE NEGATIVE mg/dL    BILIRUBIN NEGATIVE NEGATIVE    KETONES TRACE (A) NEGATIVE mg/dL    SPECIFIC GRAVITY 1.020 1.005 - 1.030    BLOOD NEGATIVE NEGATIVE    pH 5.5 5.0 - 7.0 Units    PROTEIN(URINE) NEGATIVE NEGATIVE mg/dL    UROBILINOGEN 0.2 0.0 - 1.0 mg/dL    NITRITE NEGATIVE NEGATIVE    LEUKOCYTE ESTERASE NEGATIVE NEGATIVE    SPECIMEN TYPE URINE, CLEAN CATCH/MIDSTREAM    Basic Metabolic Panel   Result Value Ref Range    Sodium 141 135 - 145 mmol/L    Potassium 4.3 3.4 - 5.1 mmol/L    Chloride 106 98 - 107 mmol/L    Carbon Dioxide 26 21 - 32 mmol/L    Anion Gap 13 10 - 20 mmol/L    Glucose 108 (H) 65 - 99 mg/dL    BUN 22 (H) 10 - 20 mg/dL    Creatinine 1.08 0.67 - 1.17 mg/dL    GFR Estimate,  73     GFR Estimate, Non  63     BUN/Creatinine Ratio 20 7 - 25    CALCIUM 8.8 8.4 - 10.2 mg/dL   Troponin I Ultra Sensitive   Result Value Ref Range    TROPONIN I <0.02 <0.05 ng/mL   B Type Natriuretic Peptide BNP   Result Value Ref Range    B-TYPE NATRIURETIC PEPTIDE 102 (H) <100 pg/mL   Lactic Acid Venous   Result Value Ref Range    Lactic Acid Venous 1.5 0 - 2.0 mmol/L       EKG Results     EKG Interpretation    Sinus tach, rate 103, LAFB, no acute evidence of ST elevation or depression.  No previous EKG's available for comparison.    EKG interpreted by ED physician    Radiology Results     Imaging Results         XR Chest PA and Lateral (Final result) Result time:  01/12/17 16:30:39    Final result    Impression:    IMPRESSION:  Possible small bilateral pleural effusions. No focal consolidation is seen.             Narrative:    EXAM:  XR CHEST PA AND LATERAL    HISTORY:  dyspnea    TECHNIQUE: PA and lateral radiographs of the chest were obtained.    COMPARISONS: None.    FINDINGS:  The cardiomediastinal silhouette is well-maintained. The lungs are clear  without evidence of focal consolidation or atelectasis. There is  no  pneumothorax. The left costophrenic angle is not clearly defined and a  small left pleural effusion is possible. The right posterior costophrenic  angle is blunted and a small left pleural effusion is also possible. There  are degenerative changes in the lower thoracic spine.                ED Medication Orders     Start Ordered     Status Ordering Provider    01/12/17 1540 01/12/17 1539  albuterol-ipratropium 2.5 mg/0.5 mg (DUONEB) nebulizer 3 mL  ONCE      Last MAR action:  Given STALIN MONK    01/12/17 1540 01/12/17 1539  methylPREDNISolone (solu-MEDROL) PF injection 125 mg  ONCE      Last MAR action:  Given STALIN MONK          MDM  Number of Diagnoses or Management Options  COPD exacerbation:   Diagnosis management comments: 86-year-old male with shortness of breath worsening over the last few days, COPD exacerbation versus CHF, MRSA, or combination of both. Consider viral illness as well as the setting of COPD history and CHF history.     No overt evidence of failure or discrete filtrated findings on chest x-ray. Does have a nonspecific leukocytosis. He does have some clinical dehydration and there is concern for possible subclinical pneumonia. Will be treated with azithromycin, as well as prednisone for likely COPD flare. Possibly worsened by recent viral illness. Offered hospitalization however at this point he would like to try to go home and treat his symptoms with prescribed medications, if his symptoms worsen he will seek reevaluation either here with his primary care provider. His wife and daughter at the bedside and comfortable with this decision to attempt treatment at home. Has moderate to high likelihood of return for hospitalization.       Amount and/or Complexity of Data Reviewed  Clinical lab tests: ordered and reviewed  Tests in the radiology section of CPT®: ordered and reviewed  Independent visualization of images, tracings, or specimens: yes      Evaluation:  4:30 PM, significant relief  of symptoms with duoneb treatment.   Clinical Impression     ED Diagnosis        Final diagnosis    COPD exacerbation           Disposition        Discharge  Moise Zabaladrickson discharge to home/self care.        Medications   albuterol-ipratropium 2.5 mg/0.5 mg (DUONEB) nebulizer 3 mL (3 mLs Nebulization Given 1/12/17 1600)   methylPREDNISolone (solu-MEDROL) PF injection 125 mg (125 mg Intravenous Given 1/12/17 1553)           Follow up:  Spenser Butler MD  [address] [place] [address]  [phone]    Schedule an appointment as soon as possible for a visit on 1/16/2017        Patient was instructed to return to the ED immediately if symptoms worsen or any new unusual symptoms arise.     New Prescriptions:  New Prescriptions    AZITHROMYCIN (ZITHROMAX Z-NITZA) 250 MG TABLET    Take 2 tablets (500 mg) by mouth x 1 day then 1 tablet (250 mg) by mouth daily x 4 days.    PREDNISONE (DELTASONE) 20 MG TABLET    Take 2 tablets by mouth daily.             The patient understands that this is a provisional diagnosis. Provisional diagnosis can and do change. The diagnosis that you are discharged with today is based on the symptoms with which you presented today. If any new symptoms occur or worsen, you should seek immediate attention for re-evaluation.             Juve Hurt DO  01/12/17 9033     Yes

## 2019-04-10 NOTE — H&P PST ADULT - NEGATIVE NEUROLOGICAL SYMPTOMS
no loss of consciousness/no hemiparesis/no weakness/no generalized seizures/no tremors/no vertigo/no loss of sensation/no transient paralysis/no headache/no facial palsy/no focal seizures/no syncope/no difficulty walking/no confusion

## 2019-04-19 ENCOUNTER — OUTPATIENT (OUTPATIENT)
Dept: OUTPATIENT SERVICES | Facility: HOSPITAL | Age: 64
LOS: 1 days | End: 2019-04-19
Payer: COMMERCIAL

## 2019-04-19 ENCOUNTER — RESULT REVIEW (OUTPATIENT)
Age: 64
End: 2019-04-19

## 2019-04-19 VITALS
OXYGEN SATURATION: 97 % | SYSTOLIC BLOOD PRESSURE: 99 MMHG | HEART RATE: 64 BPM | HEIGHT: 67 IN | WEIGHT: 166.89 LBS | TEMPERATURE: 98 F | RESPIRATION RATE: 16 BRPM | DIASTOLIC BLOOD PRESSURE: 53 MMHG

## 2019-04-19 VITALS
SYSTOLIC BLOOD PRESSURE: 130 MMHG | TEMPERATURE: 98 F | OXYGEN SATURATION: 98 % | RESPIRATION RATE: 16 BRPM | HEART RATE: 70 BPM | DIASTOLIC BLOOD PRESSURE: 80 MMHG

## 2019-04-19 DIAGNOSIS — Z98.891 HISTORY OF UTERINE SCAR FROM PREVIOUS SURGERY: Chronic | ICD-10-CM

## 2019-04-19 DIAGNOSIS — Z98.890 OTHER SPECIFIED POSTPROCEDURAL STATES: Chronic | ICD-10-CM

## 2019-04-19 DIAGNOSIS — Z01.818 ENCOUNTER FOR OTHER PREPROCEDURAL EXAMINATION: ICD-10-CM

## 2019-04-19 DIAGNOSIS — N83.209 UNSPECIFIED OVARIAN CYST, UNSPECIFIED SIDE: ICD-10-CM

## 2019-04-19 PROCEDURE — 86901 BLOOD TYPING SEROLOGIC RH(D): CPT

## 2019-04-19 PROCEDURE — 88307 TISSUE EXAM BY PATHOLOGIST: CPT | Mod: 26

## 2019-04-19 PROCEDURE — 36415 COLL VENOUS BLD VENIPUNCTURE: CPT

## 2019-04-19 PROCEDURE — 58661 LAPAROSCOPY REMOVE ADNEXA: CPT

## 2019-04-19 PROCEDURE — 88112 CYTOPATH CELL ENHANCE TECH: CPT

## 2019-04-19 PROCEDURE — 88305 TISSUE EXAM BY PATHOLOGIST: CPT

## 2019-04-19 PROCEDURE — 88112 CYTOPATH CELL ENHANCE TECH: CPT | Mod: 26

## 2019-04-19 PROCEDURE — 88307 TISSUE EXAM BY PATHOLOGIST: CPT

## 2019-04-19 PROCEDURE — 86850 RBC ANTIBODY SCREEN: CPT

## 2019-04-19 PROCEDURE — 88305 TISSUE EXAM BY PATHOLOGIST: CPT | Mod: 26

## 2019-04-19 PROCEDURE — 86900 BLOOD TYPING SEROLOGIC ABO: CPT

## 2019-04-19 RX ORDER — ACETAMINOPHEN 500 MG
1000 TABLET ORAL ONCE
Qty: 0 | Refills: 0 | Status: COMPLETED | OUTPATIENT
Start: 2019-04-19 | End: 2019-04-19

## 2019-04-19 RX ORDER — HYDROMORPHONE HYDROCHLORIDE 2 MG/ML
0.5 INJECTION INTRAMUSCULAR; INTRAVENOUS; SUBCUTANEOUS
Qty: 0 | Refills: 0 | Status: DISCONTINUED | OUTPATIENT
Start: 2019-04-19 | End: 2019-04-20

## 2019-04-19 RX ORDER — ONDANSETRON 8 MG/1
4 TABLET, FILM COATED ORAL ONCE
Qty: 0 | Refills: 0 | Status: DISCONTINUED | OUTPATIENT
Start: 2019-04-19 | End: 2019-04-20

## 2019-04-19 RX ORDER — SODIUM CHLORIDE 9 MG/ML
1000 INJECTION, SOLUTION INTRAVENOUS
Qty: 0 | Refills: 0 | Status: DISCONTINUED | OUTPATIENT
Start: 2019-04-19 | End: 2019-04-20

## 2019-04-19 RX ORDER — IBUPROFEN 200 MG
1 TABLET ORAL
Qty: 20 | Refills: 0 | OUTPATIENT
Start: 2019-04-19 | End: 2019-04-23

## 2019-04-19 RX ORDER — IBUPROFEN 200 MG
600 TABLET ORAL EVERY 6 HOURS
Qty: 0 | Refills: 0 | Status: DISCONTINUED | OUTPATIENT
Start: 2019-04-19 | End: 2019-04-27

## 2019-04-19 RX ORDER — CETIRIZINE HYDROCHLORIDE 10 MG/1
1 TABLET ORAL
Qty: 0 | Refills: 0 | COMMUNITY

## 2019-04-19 RX ORDER — ACETAMINOPHEN 500 MG
2 TABLET ORAL
Qty: 30 | Refills: 0 | OUTPATIENT
Start: 2019-04-19 | End: 2019-04-23

## 2019-04-19 RX ADMIN — Medication 1000 MILLIGRAM(S): at 18:40

## 2019-04-19 RX ADMIN — Medication 600 MILLIGRAM(S): at 19:20

## 2019-04-19 RX ADMIN — Medication 600 MILLIGRAM(S): at 19:05

## 2019-04-19 RX ADMIN — SODIUM CHLORIDE 3 MILLILITER(S): 9 INJECTION INTRAMUSCULAR; INTRAVENOUS; SUBCUTANEOUS at 14:09

## 2019-04-19 RX ADMIN — Medication 400 MILLIGRAM(S): at 18:18

## 2019-04-19 NOTE — BRIEF OPERATIVE NOTE - OPERATION/FINDINGS
simple appearing left ovarian cyst measuring about 6cm  normal appearing right ovary and fallopian tube  normal appearing uterus  small omental adhesions to the anterior abdominal wall

## 2019-04-19 NOTE — ASU DISCHARGE PLAN (ADULT/PEDIATRIC) - CARE PROVIDER_API CALL
Nacho Xiong)  OBGYN at [address] [place] [address]  [place] [address]  Phone: [phone]  Fax: [phone]  Follow Up Time:

## 2019-04-19 NOTE — ASU DISCHARGE PLAN (ADULT/PEDIATRIC) - ACTIVITY LEVEL
No sports/gym/No heavy lifting/No douching/No tampons/Nothing per rectum/Nothing per vagina/No tub baths/No intercourse/No excercise

## 2019-04-30 PROBLEM — D21.9 BENIGN NEOPLASM OF CONNECTIVE AND OTHER SOFT TISSUE, UNSPECIFIED: Chronic | Status: ACTIVE | Noted: 2019-04-10

## 2019-04-30 PROBLEM — J30.2 OTHER SEASONAL ALLERGIC RHINITIS: Chronic | Status: ACTIVE | Noted: 2019-04-10

## 2019-04-30 PROBLEM — B02.29 OTHER POSTHERPETIC NERVOUS SYSTEM INVOLVEMENT: Chronic | Status: ACTIVE | Noted: 2019-04-10

## 2019-04-30 PROBLEM — N83.209 UNSPECIFIED OVARIAN CYST, UNSPECIFIED SIDE: Chronic | Status: ACTIVE | Noted: 2019-04-10

## 2019-04-30 PROBLEM — B02.9 ZOSTER WITHOUT COMPLICATIONS: Chronic | Status: ACTIVE | Noted: 2019-04-10

## 2019-05-01 ENCOUNTER — APPOINTMENT (OUTPATIENT)
Dept: OBGYN | Facility: CLINIC | Age: 64
End: 2019-05-01
Payer: COMMERCIAL

## 2019-05-01 VITALS
BODY MASS INDEX: 26.53 KG/M2 | TEMPERATURE: 97.5 F | WEIGHT: 169 LBS | DIASTOLIC BLOOD PRESSURE: 74 MMHG | HEART RATE: 74 BPM | HEIGHT: 67 IN | OXYGEN SATURATION: 98 % | SYSTOLIC BLOOD PRESSURE: 116 MMHG

## 2019-05-01 PROCEDURE — 99213 OFFICE O/P EST LOW 20 MIN: CPT

## 2019-05-01 NOTE — CHIEF COMPLAINT
[Post Op Day: ___] : post op day #[unfilled] [Procedure: ___] : status post [unfilled] [Indication: ___] : for [unfilled] [Other: _____] : [unfilled] [FreeTextEntry1] : [id number] [[employer] ] : provided by [employer]

## 2019-05-01 NOTE — HISTORY OF PRESENT ILLNESS
[1/10] : the patient reports pain that is 1/10 in severity [Fever] : no fever [Chills] : no chills [Nausea] : no nausea [Vomiting] : no vomiting [Diarrhea] : no diarrhea [Vaginal Bleeding] : vaginal bleeding [Pelvic Pressure] : no pelvic pressure [Dysuria] : no dysuria [Constipation] : no constipation [Erythema] : not erythematous [Clean/Dry/Intact] : clean, dry and intact [Swelling] : not swollen [Dehiscence] : not dehisced [Healed] : healed [Mild] : mild vaginal bleeding [Normal] : the vagina was normal [Excellent Pain Control] : has excellent pain control [Doing Well] : is doing well [No Sign of Infection] : is showing no signs of infection [No [place]] : to avoid sexual intercourse [Limited ADLs] : to participate in activities of daily living with limitations [Limited Work] : to work with limitations [Limited Housework] : to do housework with limitations [No Sports] : not to participate in sports [de-identified] : reports since surgery seeing spots of blood when she wipes after urinating. also has smelly vaginal discharge. otherwise no complaints. ambulating, voiding, toelrating diet, +BM w/o difficulty. [de-identified] : Gen: NAD   abd: soft, ND/NT, no rebound or guarding  [de-identified] : [] spotting most likely from uterine manipulator use intraop. no active bleeding but small blood tinged mucousy discharge seen at cervix. will send UA/UCx to r/o urinary source [] affirm  [] RTC in 2wks

## 2019-05-02 LAB
APPEARANCE: CLEAR
BACTERIA: NEGATIVE
BILIRUBIN URINE: NEGATIVE
BLOOD URINE: ABNORMAL
COLOR: NORMAL
GLUCOSE QUALITATIVE U: NEGATIVE
HYALINE CASTS: 2 /LPF
KETONES URINE: NEGATIVE
LEUKOCYTE ESTERASE URINE: ABNORMAL
MICROSCOPIC-UA: NORMAL
NITRITE URINE: NEGATIVE
PH URINE: 6
PROTEIN URINE: NEGATIVE
RED BLOOD CELLS URINE: 4 /HPF
SPECIFIC GRAVITY URINE: 1.02
SQUAMOUS EPITHELIAL CELLS: 2 /HPF
UROBILINOGEN URINE: NORMAL
WHITE BLOOD CELLS URINE: 8 /HPF

## 2019-05-06 DIAGNOSIS — B96.89 ACUTE VAGINITIS: ICD-10-CM

## 2019-05-06 DIAGNOSIS — N76.0 ACUTE VAGINITIS: ICD-10-CM

## 2019-05-06 RX ORDER — METRONIDAZOLE 7.5 MG/G
0.75 GEL VAGINAL
Qty: 1 | Refills: 1 | Status: ACTIVE | COMMUNITY
Start: 2019-05-06 | End: 1900-01-01

## 2019-05-08 LAB
BACTERIA UR CULT: NORMAL
CANDIDA VAG CYTO: NOT DETECTED
G VAGINALIS+PREV SP MTYP VAG QL MICRO: DETECTED
T VAGINALIS VAG QL WET PREP: NOT DETECTED

## 2019-05-15 ENCOUNTER — APPOINTMENT (OUTPATIENT)
Dept: OBGYN | Facility: CLINIC | Age: 64
End: 2019-05-15
Payer: COMMERCIAL

## 2019-05-15 VITALS
DIASTOLIC BLOOD PRESSURE: 72 MMHG | SYSTOLIC BLOOD PRESSURE: 104 MMHG | OXYGEN SATURATION: 98 % | HEART RATE: 71 BPM | WEIGHT: 174 LBS | BODY MASS INDEX: 27.31 KG/M2 | TEMPERATURE: 98.5 F | HEIGHT: 67 IN

## 2019-05-15 DIAGNOSIS — Z09 ENCOUNTER FOR FOLLOW-UP EXAMINATION AFTER COMPLETED TREATMENT FOR CONDITIONS OTHER THAN MALIGNANT NEOPLASM: ICD-10-CM

## 2019-05-15 PROCEDURE — 99213 OFFICE O/P EST LOW 20 MIN: CPT

## 2019-05-15 NOTE — CHIEF COMPLAINT
[Post Op Day: ___] : post op day #[unfilled] [Procedure: ___] : status post [unfilled] [Indication: ___] : for [unfilled] [Other: _____] : [unfilled] [[employer] ] : provided by [employer]   [FreeTextEntry1] : [id number]

## 2019-05-15 NOTE — HISTORY OF PRESENT ILLNESS
[0/10] : no pain reported [Chills] : no chills [Fever] : no fever [Nausea] : no nausea [Vomiting] : no vomiting [Diarrhea] : no diarrhea [Vaginal Bleeding] : no vaginal bleeding [Pelvic Pressure] : no pelvic pressure [Dysuria] : no dysuria [Vaginal Discharge] : no vaginal discharge [Constipation] : no constipation [Clean/Dry/Intact] : clean, dry and intact [Healed] : healed [Doing Well] : is doing well [Excellent Pain Control] : has excellent pain control [No Sign of Infection] : is showing no signs of infection [No [place]] : to avoid sexual intercourse [Unlimited ADLs] : to participate in activities of daily living without limitations as pain allows [Unlimited Work] : to work without limitations [Unlimited Housework] : to do housework without limitations [No Sports] : not to participate in sports [de-identified] : vaginal irritation and discharge resolved. doing well w/o complaints. ambulating, voiding, tolerating diet. [de-identified] : [] may resume all normal activities in 2wks including exercise and heavy lifting [] RTC 4/2020 for annual exam [de-identified] : abd: soft, ND/NT, no rebound or guarding   ext: NTBL

## 2019-08-15 ENCOUNTER — APPOINTMENT (OUTPATIENT)
Dept: OBGYN | Facility: CLINIC | Age: 64
End: 2019-08-15
Payer: COMMERCIAL

## 2019-08-15 VITALS
TEMPERATURE: 98 F | DIASTOLIC BLOOD PRESSURE: 70 MMHG | OXYGEN SATURATION: 98 % | SYSTOLIC BLOOD PRESSURE: 108 MMHG | WEIGHT: 169 LBS | HEIGHT: 67 IN | HEART RATE: 67 BPM | BODY MASS INDEX: 26.53 KG/M2

## 2019-08-15 DIAGNOSIS — R10.31 RIGHT LOWER QUADRANT PAIN: ICD-10-CM

## 2019-08-15 PROCEDURE — 99213 OFFICE O/P EST LOW 20 MIN: CPT

## 2019-08-16 LAB
C TRACH RRNA SPEC QL NAA+PROBE: NOT DETECTED
N GONORRHOEA RRNA SPEC QL NAA+PROBE: NOT DETECTED
SOURCE AMPLIFICATION: NORMAL
SOURCE AMPLIFICATION: NORMAL
T VAGINALIS RRNA SPEC QL NAA+PROBE: NOT DETECTED

## 2020-01-08 ENCOUNTER — APPOINTMENT (OUTPATIENT)
Dept: OBGYN | Facility: CLINIC | Age: 65
End: 2020-01-08

## 2020-03-17 ENCOUNTER — APPOINTMENT (OUTPATIENT)
Dept: OBGYN | Facility: CLINIC | Age: 65
End: 2020-03-17

## 2020-05-21 NOTE — ED ADULT NURSE NOTE - CCCP TRG CHIEF CMPLNT
Detail Level: Detailed
Quality 110: Preventive Care And Screening: Influenza Immunization: Influenza Immunization previously received during influenza season
abdominal pain

## 2020-12-21 PROBLEM — N76.0 BACTERIAL VAGINOSIS: Status: RESOLVED | Noted: 2019-05-06 | Resolved: 2020-12-21

## 2020-12-21 PROBLEM — Z01.419 ENCOUNTER FOR CERVICAL PAP SMEAR WITH PELVIC EXAM: Status: RESOLVED | Noted: 2019-04-03 | Resolved: 2020-12-21

## 2021-03-25 ENCOUNTER — APPOINTMENT (OUTPATIENT)
Dept: INTERNAL MEDICINE | Facility: CLINIC | Age: 66
End: 2021-03-25
Payer: COMMERCIAL

## 2021-03-25 VITALS — SYSTOLIC BLOOD PRESSURE: 120 MMHG | DIASTOLIC BLOOD PRESSURE: 70 MMHG

## 2021-03-25 VITALS
OXYGEN SATURATION: 98 % | WEIGHT: 176 LBS | HEART RATE: 70 BPM | DIASTOLIC BLOOD PRESSURE: 85 MMHG | TEMPERATURE: 98.1 F | SYSTOLIC BLOOD PRESSURE: 125 MMHG | HEIGHT: 67 IN | BODY MASS INDEX: 27.62 KG/M2

## 2021-03-25 DIAGNOSIS — R06.02 SHORTNESS OF BREATH: ICD-10-CM

## 2021-03-25 DIAGNOSIS — Z82.49 FAMILY HISTORY OF ISCHEMIC HEART DISEASE AND OTHER DISEASES OF THE CIRCULATORY SYSTEM: ICD-10-CM

## 2021-03-25 DIAGNOSIS — L50.0 ALLERGIC URTICARIA: ICD-10-CM

## 2021-03-25 DIAGNOSIS — U07.1 COVID-19: ICD-10-CM

## 2021-03-25 DIAGNOSIS — M85.80 OTHER SPECIFIED DISORDERS OF BONE DENSITY AND STRUCTURE, UNSPECIFIED SITE: ICD-10-CM

## 2021-03-25 PROCEDURE — 99204 OFFICE O/P NEW MOD 45 MIN: CPT

## 2021-03-25 PROCEDURE — 99072 ADDL SUPL MATRL&STAF TM PHE: CPT

## 2021-03-25 RX ORDER — VIT A/VIT C/VIT E/ZINC/COPPER 4296-226
CAPSULE ORAL
Refills: 0 | Status: ACTIVE | COMMUNITY
Start: 2021-03-25

## 2021-03-25 RX ORDER — MELATONIN 3 MG
25 MCG TABLET ORAL
Qty: 30 | Refills: 0 | Status: ACTIVE | COMMUNITY
Start: 2021-03-25

## 2021-03-25 NOTE — ASSESSMENT
[FreeTextEntry1] : Pt was advised to have her medical records forward here if she plans to be part of my practice, and RTO for CPE at the end of the year.

## 2021-03-25 NOTE — PHYSICAL EXAM
[No Acute Distress] : no acute distress [Well Nourished] : well nourished [Well Developed] : well developed [Normal Sclera/Conjunctiva] : normal sclera/conjunctiva [PERRL] : pupils equal round and reactive to light [EOMI] : extraocular movements intact [Normal Outer Ear/Nose] : the outer ears and nose were normal in appearance [Normal Oropharynx] : the oropharynx was normal [Normal TMs] : both tympanic membranes were normal [No JVD] : no jugular venous distention [No Lymphadenopathy] : no lymphadenopathy [Supple] : supple [No Respiratory Distress] : no respiratory distress  [Clear to Auscultation] : lungs were clear to auscultation bilaterally [Normal Rate] : normal rate  [Regular Rhythm] : with a regular rhythm [Normal S1, S2] : normal S1 and S2 [No Edema] : there was no peripheral edema [No Extremity Clubbing/Cyanosis] : no extremity clubbing/cyanosis [Soft] : abdomen soft [Non Tender] : non-tender [Normal Bowel Sounds] : normal bowel sounds [Normal Supraclavicular Nodes] : no supraclavicular lymphadenopathy [Normal Posterior Cervical Nodes] : no posterior cervical lymphadenopathy [Normal Anterior Cervical Nodes] : no anterior cervical lymphadenopathy [No CVA Tenderness] : no CVA  tenderness [No Spinal Tenderness] : no spinal tenderness [No Joint Swelling] : no joint swelling [Grossly Normal Strength/Tone] : grossly normal strength/tone [Speech Grossly Normal] : speech grossly normal [Normal Affect] : the affect was normal [Alert and Oriented x3] : oriented to person, place, and time [Normal Mood] : the mood was normal [de-identified] : female in stated age,

## 2021-03-25 NOTE — HISTORY OF PRESENT ILLNESS
[FreeTextEntry8] : Patient presented for the first time for transition of care, she's looking for a new PCP.    Last PE and blood was in 1/2021.\par \par In 1/2021, she had COVID infection and was sick for about 5-6 weeks.  She was not hospitalized, but took some meds from [place].  She took hydroxychloroquine, she also took steroids for 2 weeks.  She felt better in the past 2-3 weeks.  However, she still does not have all of her last and smell yet.  She also does not sleep well.  She takes Magnesium Citrate.  She can only sleep 3 hours.  She goes to bed at 10 AM, and wakes up at 1 AM.  She tries to do yoga.\par \par She also tries to walk but has BACON.  She is trying to exercise more, but do not feel that she has the energy yet.

## 2021-04-27 NOTE — ED PROVIDER NOTE - TOBACCO USE
D/C order noted. Spoke with the patient's nurse, no D/C needs known at this time. She will contact  for any needs.   Jordy Bruno RN, BSN, Case Management  Phone: [phone]  Electronically signed by Jordy Bruno RN on 4/27/2021 at 5:31 PM Unknown if ever smoked

## 2022-12-13 ENCOUNTER — EMERGENCY (EMERGENCY)
Facility: HOSPITAL | Age: 67
LOS: 1 days | Discharge: HOME | End: 2022-12-13
Attending: EMERGENCY MEDICINE
Payer: COMMERCIAL

## 2022-12-13 VITALS
TEMPERATURE: 98 F | DIASTOLIC BLOOD PRESSURE: 77 MMHG | SYSTOLIC BLOOD PRESSURE: 118 MMHG | RESPIRATION RATE: 18 BRPM | OXYGEN SATURATION: 98 % | HEART RATE: 78 BPM

## 2022-12-13 VITALS
OXYGEN SATURATION: 97 % | TEMPERATURE: 98 F | HEART RATE: 89 BPM | SYSTOLIC BLOOD PRESSURE: 104 MMHG | DIASTOLIC BLOOD PRESSURE: 72 MMHG | RESPIRATION RATE: 18 BRPM | WEIGHT: 160.06 LBS | HEIGHT: 65 IN

## 2022-12-13 DIAGNOSIS — Z98.890 OTHER SPECIFIED POSTPROCEDURAL STATES: Chronic | ICD-10-CM

## 2022-12-13 DIAGNOSIS — Z98.891 HISTORY OF UTERINE SCAR FROM PREVIOUS SURGERY: Chronic | ICD-10-CM

## 2022-12-13 PROCEDURE — 99284 EMERGENCY DEPT VISIT MOD MDM: CPT

## 2022-12-13 RX ORDER — METHOCARBAMOL 500 MG/1
1500 TABLET, FILM COATED ORAL ONCE
Refills: 0 | Status: COMPLETED | OUTPATIENT
Start: 2022-12-13 | End: 2022-12-13

## 2022-12-13 RX ORDER — LIDOCAINE 4 G/100G
1 CREAM TOPICAL ONCE
Refills: 0 | Status: COMPLETED | OUTPATIENT
Start: 2022-12-13 | End: 2022-12-13

## 2022-12-13 RX ORDER — ACETAMINOPHEN 500 MG
975 TABLET ORAL ONCE
Refills: 0 | Status: COMPLETED | OUTPATIENT
Start: 2022-12-13 | End: 2022-12-13

## 2022-12-13 RX ORDER — IBUPROFEN 200 MG
600 TABLET ORAL ONCE
Refills: 0 | Status: COMPLETED | OUTPATIENT
Start: 2022-12-13 | End: 2022-12-13

## 2022-12-13 RX ADMIN — Medication 600 MILLIGRAM(S): at 17:21

## 2022-12-13 RX ADMIN — Medication 975 MILLIGRAM(S): at 17:21

## 2022-12-13 RX ADMIN — METHOCARBAMOL 1500 MILLIGRAM(S): 500 TABLET, FILM COATED ORAL at 19:10

## 2022-12-13 NOTE — ED PROVIDER NOTE - MUSCULOSKELETAL, MLM
Spine appears normal, range of motion is not limited. No vertebral point tenderness to palpation. No stepoffs. No deformities. +L paraspinal L4/5 tenderness extending to L buttock.  Neg SLR. ROM at hip limited by back pain. Dorsi/plantar flexion 5/5.

## 2022-12-13 NOTE — ED PROVIDER NOTE - OBJECTIVE STATEMENT
66yo F PMH L4/5 chronic LBP, ovarian cyst s/p BL oophrectomy/salpingectomy, s/p myomectomy presents for evaluation of acute on chronic LBP radiating to L buttock x few days. Same quality but greater intensity as prior pains. Pain w movement and ambulation. No saddle anesthesia. No motor/sensory changes. No weakness. No recent injections. No fever or chills. Has not taken any pain medication at home because she does not like to take medicine. Requesting a "diagnostic test" to bring to acupuncturist for therapy.

## 2022-12-13 NOTE — ED PROVIDER NOTE - ATTENDING APP SHARED VISIT CONTRIBUTION OF CARE
Attending MD Ventura:   I personally have seen and examined this patient.  Physician assistant note reviewed and agree on plan of care and except where noted.  See below for details.     Seen in [place], accompanied by boyfriend  Mandarin  Julia [id number] 89F with PMH/PSH including chronic lower back pain (years) presents to the ED with lower back pain.  Reports Attending MD Ventura:   I personally have seen and examined this patient.  Physician assistant note reviewed and agree on plan of care and except where noted.  See below for details.     Seen in [place], accompanied by boyfriend  Jane  Julia [id number]    67F with PMH/PSH including chronic lower back pain (years), L4-L5 pathology (unclear specific) presents to the ED with lower back pain.  Anuj typically her exacerbations of her baseline pain happen with weather change, typically twice during the winter.  Anuj has had worsened pain over the last 9 days.  Anuj typically rests for a week with improvement of pain.  Anuj wants imaging to bring to her dry needling person.  Anuj is able to ambulate.  Reports that she does not take medications, including analgesics, denies taking anything for pain prior to presenting.  Reports she saw a pain management doctor in the distant past but anuj did not feel well after the medication so has not seen one since.  Anuj is scheduled for an appointment on the 20th.  Denies numbness, weakness or tingling in extremities. Denies loss of urinary or bowel continence. Denies history of malignancy, chronic steroid use, epidural injections, AC use, prior spinal surgery, AAA.  Anuj was on steroids briefly during her course of Covid 2 yrs ago.  Denies chest pain, shortness of breath, palpitations. Denies abdominal pain, nausea, vomiting, diarrhea, bloody or black stools. Anuj has had occasional R lower abdominal pain, unrelated to this lower back pain and follows with a "chinese doctor" for gynecology and will follow up.    Exam:   General: NAD  HENT: head NCAT, airway patent   Eyes: anicteric, no conjunctival injection   Lungs: lungs CTAB with good inspiratory effort, no wheezing, no rhonchi, no rales  Cardiac: +S1S2, no obvious m/r/g  GI: abdomen soft with +BS, NT, ND  : no CVAT  MSK: FROM at neck, no tenderness to midline palpation, +L paralumbar tenderness, no overlying ecchymosis, rash, no stepoffs along length of spine  Neuro: moving all extremities spontaneously with 5/5 strength, ambulating independently, no saddle anesthesia, sensory grossly intact, no gross neuro deficits  Psych: normal mood and affect     A/P: 67F with acute on chronic lower back pain, no attempted analgesia at home, no red flags, will give meds, reassess Attending MD Ventura:   I personally have seen and examined this patient.  Physician assistant note reviewed and agree on plan of care and except where noted.  See below for details.     Seen in [place], accompanied by boyfriend  Jane  Julia [id number]    67F with PMH/PSH including chronic lower back pain (years), L4-L5 pathology (unclear specific) presents to the ED with lower back pain.  Anuj typically her exacerbations of her baseline pain happen with weather change, typically twice during the winter.  Anuj has had worsened pain over the last 9 days.  Anuj typically rests for a week with improvement of pain.  Anuj wants imaging to bring to her dry needling person.  Anuj is able to ambulate.  Reports that she does not take medications, including analgesics, denies taking anything for pain prior to presenting.  Reports she saw a pain management doctor in the distant past but anuj did not feel well after the medication so has not seen one since.  Anuj is scheduled for an appointment on the 20th.  Denies numbness, weakness or tingling in extremities. Denies loss of urinary or bowel continence. Denies history of malignancy, chronic steroid use, epidural injections, AC use, prior spinal surgery, AAA.  Anuj was on steroids briefly during her course of Covid 2 yrs ago.  Denies chest pain, shortness of breath, palpitations. Denies abdominal pain, nausea, vomiting, diarrhea, bloody or black stools. Anuj has had occasional R lower abdominal pain, unrelated to this lower back pain and follows with a "chinese doctor" for gynecology and will follow up.    Exam:   General: NAD  HENT: head NCAT, airway patent   Eyes: anicteric, no conjunctival injection   Lungs: lungs CTAB with good inspiratory effort, no wheezing, no rhonchi, no rales  Cardiac: +S1S2, no obvious m/r/g  GI: abdomen soft with +BS, NT, ND  : no CVAT  MSK: FROM at neck, no tenderness to midline palpation, +L paralumbar tenderness, no overlying ecchymosis, rash, no stepoffs along length of spine  Neuro: moving all extremities spontaneously with 5/5 strength, ambulating independently, no saddle anesthesia, sensory grossly intact, no gross neuro deficits  Psych: normal mood and affect     A/P: 67F with acute on chronic lower back pain, suspect MSK, no attempted analgesia at home, no red flags, will give meds, reassess

## 2022-12-13 NOTE — ED PROVIDER NOTE - PLAN OF CARE
Acute on chronic LBP x few days, no analgesics outpatient Acute on chronic LBP x few days, no analgesics outpatient wo s/s concerning for cord pathology  -Analgesia - Tylenol, Motrin, lidocaine patch  -F/u outpatient for consideration of outpatient mri if indicated per specialist

## 2022-12-13 NOTE — ED PROVIDER NOTE - NSFOLLOWUPINSTRUCTIONS_ED_ALL_ED_FT
Rest. Hydrate. No heavy lifting.  For pain:  -Take tylenol 650mg every 4-6 hours as needed for pain.  -Take motrin 600mg by mouth every 8 hours as needed for pain. Take with food.  -Lidocaine patch to Right neck/trapezius. This is available over the counter as Aspercream or Salonpas. Leave in place for 12 hours, then remove for 12 hours prior to applying a new patch.   Call [facility] - [phone] for further evaluation and management.  You may also follow up with your Neurologist at your upcoming appointment for further evaluation.  Return to ER for new or worsened symptoms including fever, weakness, inability to walk, urinary/bladder incontinence, numbness/tingling to groin or any concern.

## 2022-12-13 NOTE — ED PROVIDER NOTE - PATIENT PORTAL LINK FT
You can access the FollowMyHealth Patient Portal offered by [facility] by registering at the following website: [email or web address]. By joining [employer]’s FollowMyHealth portal, you will also be able to view your health information using other applications (apps) compatible with our system.

## 2022-12-13 NOTE — ED ADULT NURSE NOTE - OBJECTIVE STATEMENT
Pt 67 year old female, A/O x4. Pt came in due to back pain. PMH- Chronic pain. As per pt, x 9 days back pain, unknown origin. x2 days, pain more severe and pain with ambulation. Upon assessment, pt uncomfortable, unable to sit up independently. Skin- warm, dry, intact. Abd. soft, nontender, nondistended. Denies chest pain, SOB, fever, chills, N/V/D, numbness/ tingling, dysuria.

## 2022-12-13 NOTE — ED PROVIDER NOTE - CARE PLAN
1 Principal Discharge DX:	Back pain  Assessment and plan of treatment:	Acute on chronic LBP x few days, no analgesics outpatient   Principal Discharge DX:	Back pain  Assessment and plan of treatment:	Acute on chronic LBP x few days, no analgesics outpatient wo s/s concerning for cord pathology  -Analgesia - Tylenol, Motrin, lidocaine patch  -F/u outpatient for consideration of outpatient mri if indicated per specialist
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
## 2023-06-27 ENCOUNTER — APPOINTMENT (OUTPATIENT)
Dept: UROLOGY | Facility: CLINIC | Age: 68
End: 2023-06-27
Payer: MEDICARE

## 2023-06-27 VITALS
TEMPERATURE: 96.8 F | HEART RATE: 74 BPM | SYSTOLIC BLOOD PRESSURE: 120 MMHG | DIASTOLIC BLOOD PRESSURE: 78 MMHG | BODY MASS INDEX: 26.63 KG/M2 | RESPIRATION RATE: 16 BRPM | OXYGEN SATURATION: 99 % | WEIGHT: 170 LBS

## 2023-06-27 DIAGNOSIS — N39.0 URINARY TRACT INFECTION, SITE NOT SPECIFIED: ICD-10-CM

## 2023-06-27 DIAGNOSIS — Z78.9 OTHER SPECIFIED HEALTH STATUS: ICD-10-CM

## 2023-06-27 DIAGNOSIS — R31.9 HEMATURIA, UNSPECIFIED: ICD-10-CM

## 2023-06-27 DIAGNOSIS — A49.9 URINARY TRACT INFECTION, SITE NOT SPECIFIED: ICD-10-CM

## 2023-06-27 DIAGNOSIS — Z48.89 ENCOUNTER FOR OTHER SPECIFIED SURGICAL AFTERCARE: ICD-10-CM

## 2023-06-27 DIAGNOSIS — G47.00 INSOMNIA, UNSPECIFIED: ICD-10-CM

## 2023-06-27 PROCEDURE — 99203 OFFICE O/P NEW LOW 30 MIN: CPT

## 2023-06-27 NOTE — LETTER BODY
[FreeTextEntry1] : Omar Hussein DO\par [address],\[place] [address]\par [phone]\par \par Dear Dr. Hussein,\par \par Reason for Visit: UTI\par \par This is a 68 year-old woman with UTI. Patient is referred for evaluation of her condition. Patient reports UTI. She has had 1 urinary tract infection. Urinalysis demonstrated 100 WBC/HPF. The patient's infection was treated with antibiotics. Her recent Ultrasound was unremarkable. The patient describes urethral discomfort. The location of the discomfort is urethra. She has not tried vaginal estrogen cream previously. She is not sexually active. Currently, patient denies any gross hematuria or dysuria or urinary incontinence. The patient denies any aggravating or relieving factors. The patient denies any interference of function. The patient is entirely asymptomatic. All other review of systems are negative. She has family medical history of primary cervical cancer in her sister. She has previous surgical history of  section, left salpingo-oophorectomy, and uterine myomectomy. Past medical history, family history and social history were inquired and were noncontributory to current condition. The patient does not use tobacco or drink alcohol. Medications and allergies were reviewed. She has no known allergies to medication.\par \par On examination, the patient is a healthy-appearing woman in no acute distress. She is alert and oriented follows commands. She has normal mood and affect. She is normocephalic. Oral no thrush. Neck is supple. Respirations are unlabored. Abdomen is soft and nontender. Liver is nonpalpable. Bladder is nonpalpable. No CVA tenderness. Neurologically she is grossly intact. No peripheral edema. Skin without gross abnormality.\par \par Assessment: UTI\par \par I counseled the patient. I discussed the various etiologies of her symptoms. I recommend she obtain urinalysis, urine culture, and Chlamydia/GC amplification to evaluate for infection. Patient understands that if she develops gross hematuria or any urinary discomfort, she will contact me for further evaluation. Risks and alternatives were discussed. I answered the patient's questions. The patient will follow-up as directed and will contact me with any questions or concerns.Thank you for the opportunity to participate in the care of this patient. I'll keep you updated on her progress.\par \par Plan: Urine culture. Urinalysis. Chlamydia/GC amplification. Follow up in 3 months.

## 2023-06-27 NOTE — ADDENDUM
[FreeTextEntry1] : Entered by HYUN SOUSA, acting as scribe for Dr. Talat Whitney.\par The documentation recorded by the scribe accurately reflects the service I personally performed and the decisions made by me.

## 2023-08-11 NOTE — ED PROVIDER NOTE - NS ED MD DISPO DISCHARGE
36w6d Pt reports no complaints.  Denies contractions, vaginal bleeding, or leakage of fluid.  Reports adequate fetal movement.  Trisomy screening:Declines  Consents and TDAP on 6/30/23  F/U 1 week. Next visit: routine care   CVX 1/thick/floating  EFW 5 lb 14 oz      Home

## 2024-01-02 ENCOUNTER — APPOINTMENT (OUTPATIENT)
Dept: UROLOGY | Facility: CLINIC | Age: 69
End: 2024-01-02

## 2024-06-24 ENCOUNTER — EMERGENCY (EMERGENCY)
Facility: HOSPITAL | Age: 69
LOS: 1 days | Discharge: HOME | End: 2024-06-24
Attending: EMERGENCY MEDICINE
Payer: COMMERCIAL

## 2024-06-24 VITALS
OXYGEN SATURATION: 99 % | HEIGHT: 67 IN | DIASTOLIC BLOOD PRESSURE: 68 MMHG | WEIGHT: 169.98 LBS | RESPIRATION RATE: 16 BRPM | HEART RATE: 90 BPM | SYSTOLIC BLOOD PRESSURE: 127 MMHG

## 2024-06-24 DIAGNOSIS — Z98.890 OTHER SPECIFIED POSTPROCEDURAL STATES: Chronic | ICD-10-CM

## 2024-06-24 DIAGNOSIS — Z98.891 HISTORY OF UTERINE SCAR FROM PREVIOUS SURGERY: Chronic | ICD-10-CM

## 2024-06-24 PROCEDURE — 73080 X-RAY EXAM OF ELBOW: CPT | Mod: 26,LT

## 2024-06-24 PROCEDURE — 99284 EMERGENCY DEPT VISIT MOD MDM: CPT

## 2024-06-24 PROCEDURE — 73110 X-RAY EXAM OF WRIST: CPT

## 2024-06-24 PROCEDURE — 73090 X-RAY EXAM OF FOREARM: CPT

## 2024-06-24 PROCEDURE — 73080 X-RAY EXAM OF ELBOW: CPT

## 2024-06-24 PROCEDURE — 99284 EMERGENCY DEPT VISIT MOD MDM: CPT | Mod: 25

## 2024-06-24 PROCEDURE — 73110 X-RAY EXAM OF WRIST: CPT | Mod: 26,LT

## 2024-06-24 PROCEDURE — 73090 X-RAY EXAM OF FOREARM: CPT | Mod: 26,LT

## 2024-06-24 RX ORDER — ACETAMINOPHEN 500 MG
650 TABLET ORAL ONCE
Refills: 0 | Status: COMPLETED | OUTPATIENT
Start: 2024-06-24 | End: 2024-06-24

## 2024-06-24 RX ADMIN — Medication 650 MILLIGRAM(S): at 15:52

## 2024-06-24 NOTE — ED ADULT NURSE NOTE - OBJECTIVE STATEMENT
70 y/o female came to the ED s/p fall on Friday with known left radial fracture. Arm splinted upon arrival by urgent care from Friday. Came to the ED for a second opinion.

## 2024-06-24 NOTE — ED PROVIDER NOTE - NSFOLLOWUPINSTRUCTIONS_ED_ALL_ED_FT
You were seen in the Emergency Department for arm injury.  Your evaluation today shows that you have a fracture in your left arm, which was similar to your xrays from urgent care 3 days ago.  You have an appointment scheduled already with Dr. Jodi Collins on june 24, 2024.  Keep this appointment.  At home, you can take acetaminophen and/or ibuprofen as needed for pain.     1) Continue all previously prescribed medications as directed.    2) Follow up with your primary care physician - take copies of your results.    3) Return to the Emergency Department for worsening or persistent symptoms, and/or ANY NEW OR CONCERNING SYMPTOMS. You were seen in the Emergency Department for arm injury.  Your evaluation today shows that you have a fracture in your left arm, which was similar to your xrays from urgent care 3 days ago.  You have an appointment scheduled already with Dr. Jodi Collins on june 27, 2024 at 1 pm.  Keep this appointment.  At home, you can take acetaminophen and/or ibuprofen as needed for pain.     1) Continue all previously prescribed medications as directed.    2) Follow up with your primary care physician - take copies of your results.    3) Return to the Emergency Department for worsening or persistent symptoms, and/or ANY NEW OR CONCERNING SYMPTOMS.

## 2024-06-24 NOTE — ED PROVIDER NOTE - IV ALTEPLASE EXCL ABS HIDDEN
Goal Outcome Evaluation:  Plan of Care Reviewed With: patient           Outcome Summary: Pt agreed to PT session after encouragement; pt had some LOB, R lean, shuffling , wt shift difficulty so suggested if she DC s home she will need to initially use rwx and have her son assist ; she seemed unaware and thought she did well amb 12ft    Patient was intermittently wearing a face mask during this therapy encounter. Therapist used appropriate personal protective equipment including eye protection, mask, and gloves.  Mask used was standard procedure mask. Appropriate PPE was worn during the entire therapy session. Hand hygiene was completed before and after therapy session. Patient is not in enhanced droplet precautions.     show

## 2024-06-24 NOTE — ED ADULT TRIAGE NOTE - CHIEF COMPLAINT QUOTE
fell on friday has arm cast but wants a 2nd opinion was dx with transverse fx of radial neck stating having increased pain

## 2024-06-24 NOTE — ED PROVIDER NOTE - OBJECTIVE STATEMENT
69-year-old female, no chronic medical conditions, presents to ED for orthopedic evaluation after left radial fracture on  Friday 3 days ago.  Patient reports she had an endoscopy and colonoscopy at outpatient GI office, was walking outside, missed a step and fell forward landing onto her left arm.  Was seen at urgent care, had imaging done of left wrist, elbow, forearm, showing transverse radial head fracture.  Arm was splinted and patient instructed to follow-up with orthopedics.  Attempted to call orthopedics but was unable to get an appointment in a timely manner.  Patient notes pain to arm, unchanged since incident.  Did not hit her head when she fell. 69-year-old female, no chronic medical conditions, presents to ED for orthopedic evaluation after left radial fracture on  Friday 3 days ago.  Patient reports she had an endoscopy and colonoscopy at outpatient GI office, was walking outside, missed a step and fell forward landing onto her left arm.  Was seen at urgent care, had imaging done of left wrist, elbow, forearm, showing transverse radial head fracture.  Arm was splinted and patient instructed to follow-up with orthopedics.  Attempted to call orthopedics but was unable to get an appointment in a timely manner.  Patient notes pain to arm, unchanged since incident.  Did not hit her head when she fell.     #[phone]

## 2024-06-24 NOTE — ED PROVIDER NOTE - CARE PROVIDER_API CALL
Jodi Collins  Sur[facility]  [address], [address]  [place] [address]  Phone: [phone]  Fax: [phone]  Follow Up Time:

## 2024-06-24 NOTE — ED PROVIDER NOTE - PHYSICAL EXAMINATION
CONSTITUTIONAL: Well appearing, awake, alert, and in no apparent distress.  HEAD: normocephalic, atraumatic  ENT: oral mucosa moist  MSK: Spine appears normal, range of motion is not limited; , Left upper extremity splint removed, no obvious deformity, tenderness to diffuse forearm, motor and sensation intact distally, good radial pulse  NEURO: Alert and oriented, no focal deficits, no motor or sensory deficits.  SKIN: Skin normal color for race, warm, dry and intact. No evidence of rash.  PSYCH: appropriate mood and affect

## 2024-06-24 NOTE — ED PROVIDER NOTE - PATIENT PORTAL LINK FT
You can access the FollowMyHealth Patient Portal offered by [facility] by registering at the following website: [email or web address]. By joining [employer]’s FollowMyHealth portal, you will also be able to view your health information using other applications (apps) compatible with our system.

## 2024-06-24 NOTE — ED PROVIDER NOTE - CLINICAL SUMMARY MEDICAL DECISION MAKING FREE TEXT BOX
69-year-old female, no chronic medical conditions, presents to ED for orthopedic evaluation after left radial fracture on 5 Friday 3 days ago.  Patient reports she had an endoscopy and colonoscopy at outpatient GI office, was walking outside, missed a step and fell forward landing onto her left arm.  Was seen at urgent care, had imaging done of left wrist, elbow, forearm, showing transverse radial head fracture.  Arm was splinted and patient instructed to follow-up with orthopedics.  Attempted to call orthopedics but was unable to get an appointment in a timely manner.  Patient notes pain to arm, unchanged since incident.  Did not hit her head when she fell.  VSS.  Patient well appearing, in no acute distress, Left upper extremity splint removed, tenderness to diffuse forearm, motor and sensation intact distally, good radial pulse.  will Repeat imaging studies replace splint and refer patient to Ortho. 69-year-old female, no chronic medical conditions, presents to ED for orthopedic evaluation after left radial fracture on 5 Friday 3 days ago.  Patient reports she had an endoscopy and colonoscopy at outpatient GI office, was walking outside, missed a step and fell forward landing onto her left arm.  Was seen at urgent care, had imaging done of left wrist, elbow, forearm, showing transverse radial head fracture.  Arm was splinted and patient instructed to follow-up with orthopedics.  Attempted to call orthopedics but was unable to get an appointment in a timely manner.  Patient notes pain to arm, unchanged since incident.  Did not hit her head when she fell.  VSS.  Patient well appearing, in no acute distress, Left upper extremity splint removed, tenderness to diffuse forearm, motor and sensation intact distally, good radial pulse.  will Repeat imaging studies replace splint and refer patient to Ortho.  RGUJRAL 69-year-old female status post fall on the 21st presents with left upper extremity injury and unable to follow-up with orthopedics.  Patient states she went to after she fell for x-rays and was told that she has a left radial neck fracture placed in a splint and discharged with Ortho follow-up.  Patient states she was not able to get Ortho follow-up.  Complains of pain in left upper extremity.  Denies any other injury.  Patient states she had injury to her left knee but has been ambulatory.  On exam patient does not have any external signs of trauma to her head no cervical thoracic lumbar tenderness no chest wall tenderness.  Left upper extremity, Splint removed for evaluation with positive radial pulse normal sensation.  Swelling to the left proximal arm forearm and wrist.  Will obtain repeat x-rays to evaluate discussed pain control and follow-up with orthopedics.  Will enroll in a referral for follow-up.

## 2024-06-27 ENCOUNTER — APPOINTMENT (OUTPATIENT)
Dept: ORTHOPEDIC SURGERY | Facility: CLINIC | Age: 69
End: 2024-06-27
Payer: MEDICARE

## 2024-06-27 DIAGNOSIS — S52.132D DISPLACED FRACTURE OF NECK OF LEFT RADIUS, SUBSEQUENT ENCOUNTER FOR CLOSED FRACTURE WITH ROUTINE HEALING: ICD-10-CM

## 2024-06-27 PROCEDURE — 99204 OFFICE O/P NEW MOD 45 MIN: CPT

## 2024-07-25 ENCOUNTER — APPOINTMENT (OUTPATIENT)
Dept: ORTHOPEDIC SURGERY | Facility: CLINIC | Age: 69
End: 2024-07-25
Payer: MEDICARE

## 2024-07-25 DIAGNOSIS — S52.132D DISPLACED FRACTURE OF NECK OF LEFT RADIUS, SUBSEQUENT ENCOUNTER FOR CLOSED FRACTURE WITH ROUTINE HEALING: ICD-10-CM

## 2024-07-25 PROCEDURE — 73080 X-RAY EXAM OF ELBOW: CPT | Mod: LT

## 2024-07-25 PROCEDURE — 99213 OFFICE O/P EST LOW 20 MIN: CPT | Mod: 25

## 2024-09-05 ENCOUNTER — APPOINTMENT (OUTPATIENT)
Dept: ORTHOPEDIC SURGERY | Facility: CLINIC | Age: 69
End: 2024-09-05

## 2024-11-30 NOTE — H&P PST ADULT - NEUROLOGICAL
Universal Safety Interventions details… Alert & oriented; no sensory, motor or coordination deficits, normal reflexes

## 2025-02-24 ENCOUNTER — EMERGENCY (EMERGENCY)
Facility: HOSPITAL | Age: 70
LOS: 1 days | Discharge: HOME | End: 2025-02-24
Attending: EMERGENCY MEDICINE
Payer: COMMERCIAL

## 2025-02-24 VITALS
HEART RATE: 71 BPM | RESPIRATION RATE: 16 BRPM | OXYGEN SATURATION: 99 % | DIASTOLIC BLOOD PRESSURE: 73 MMHG | WEIGHT: 160.06 LBS | TEMPERATURE: 99 F | SYSTOLIC BLOOD PRESSURE: 108 MMHG | HEIGHT: 66 IN

## 2025-02-24 VITALS
TEMPERATURE: 99 F | SYSTOLIC BLOOD PRESSURE: 127 MMHG | OXYGEN SATURATION: 96 % | HEART RATE: 61 BPM | RESPIRATION RATE: 16 BRPM | DIASTOLIC BLOOD PRESSURE: 74 MMHG

## 2025-02-24 DIAGNOSIS — Z98.890 OTHER SPECIFIED POSTPROCEDURAL STATES: Chronic | ICD-10-CM

## 2025-02-24 DIAGNOSIS — Z98.891 HISTORY OF UTERINE SCAR FROM PREVIOUS SURGERY: Chronic | ICD-10-CM

## 2025-02-24 LAB
ALBUMIN SERPL ELPH-MCNC: 4 G/DL — SIGNIFICANT CHANGE UP (ref 3.3–5)
ALP SERPL-CCNC: 79 U/L — SIGNIFICANT CHANGE UP (ref 40–120)
ALT FLD-CCNC: 19 U/L — SIGNIFICANT CHANGE UP (ref 10–45)
ANION GAP SERPL CALC-SCNC: 11 MMOL/L — SIGNIFICANT CHANGE UP (ref 5–17)
AST SERPL-CCNC: 22 U/L — SIGNIFICANT CHANGE UP (ref 10–40)
BASOPHILS # BLD AUTO: 0.03 K/UL — SIGNIFICANT CHANGE UP (ref 0–0.2)
BASOPHILS NFR BLD AUTO: 0.6 % — SIGNIFICANT CHANGE UP (ref 0–2)
BILIRUB SERPL-MCNC: 0.4 MG/DL — SIGNIFICANT CHANGE UP (ref 0.2–1.2)
BUN SERPL-MCNC: 21 MG/DL — SIGNIFICANT CHANGE UP (ref 7–23)
CALCIUM SERPL-MCNC: 9.2 MG/DL — SIGNIFICANT CHANGE UP (ref 8.4–10.5)
CHLORIDE SERPL-SCNC: 104 MMOL/L — SIGNIFICANT CHANGE UP (ref 96–108)
CO2 SERPL-SCNC: 24 MMOL/L — SIGNIFICANT CHANGE UP (ref 22–31)
CREAT SERPL-MCNC: 0.53 MG/DL — SIGNIFICANT CHANGE UP (ref 0.5–1.3)
EGFR: 101 ML/MIN/1.73M2 — SIGNIFICANT CHANGE UP
EOSINOPHIL # BLD AUTO: 0.15 K/UL — SIGNIFICANT CHANGE UP (ref 0–0.5)
EOSINOPHIL NFR BLD AUTO: 2.9 % — SIGNIFICANT CHANGE UP (ref 0–6)
GLUCOSE SERPL-MCNC: 106 MG/DL — HIGH (ref 70–99)
HCT VFR BLD CALC: 40 % — SIGNIFICANT CHANGE UP (ref 34.5–45)
HGB BLD-MCNC: 13.2 G/DL — SIGNIFICANT CHANGE UP (ref 11.5–15.5)
IMM GRANULOCYTES NFR BLD AUTO: 0.2 % — SIGNIFICANT CHANGE UP (ref 0–0.9)
LYMPHOCYTES # BLD AUTO: 2.48 K/UL — SIGNIFICANT CHANGE UP (ref 1–3.3)
LYMPHOCYTES # BLD AUTO: 47.8 % — HIGH (ref 13–44)
MAGNESIUM SERPL-MCNC: 2.2 MG/DL — SIGNIFICANT CHANGE UP (ref 1.6–2.6)
MCHC RBC-ENTMCNC: 31.4 PG — SIGNIFICANT CHANGE UP (ref 27–34)
MCHC RBC-ENTMCNC: 33 G/DL — SIGNIFICANT CHANGE UP (ref 32–36)
MCV RBC AUTO: 95 FL — SIGNIFICANT CHANGE UP (ref 80–100)
MONOCYTES # BLD AUTO: 0.37 K/UL — SIGNIFICANT CHANGE UP (ref 0–0.9)
MONOCYTES NFR BLD AUTO: 7.1 % — SIGNIFICANT CHANGE UP (ref 2–14)
NEUTROPHILS # BLD AUTO: 2.15 K/UL — SIGNIFICANT CHANGE UP (ref 1.8–7.4)
NEUTROPHILS NFR BLD AUTO: 41.4 % — LOW (ref 43–77)
NRBC BLD AUTO-RTO: 0 /100 WBCS — SIGNIFICANT CHANGE UP (ref 0–0)
PHOSPHATE SERPL-MCNC: 3.1 MG/DL — SIGNIFICANT CHANGE UP (ref 2.5–4.5)
PLATELET # BLD AUTO: 238 K/UL — SIGNIFICANT CHANGE UP (ref 150–400)
POTASSIUM SERPL-MCNC: 4.1 MMOL/L — SIGNIFICANT CHANGE UP (ref 3.5–5.3)
POTASSIUM SERPL-SCNC: 4.1 MMOL/L — SIGNIFICANT CHANGE UP (ref 3.5–5.3)
PROT SERPL-MCNC: 6.7 G/DL — SIGNIFICANT CHANGE UP (ref 6–8.3)
RBC # BLD: 4.21 M/UL — SIGNIFICANT CHANGE UP (ref 3.8–5.2)
RBC # FLD: 12.5 % — SIGNIFICANT CHANGE UP (ref 10.3–14.5)
SODIUM SERPL-SCNC: 139 MMOL/L — SIGNIFICANT CHANGE UP (ref 135–145)
WBC # BLD: 5.19 K/UL — SIGNIFICANT CHANGE UP (ref 3.8–10.5)
WBC # FLD AUTO: 5.19 K/UL — SIGNIFICANT CHANGE UP (ref 3.8–10.5)

## 2025-02-24 PROCEDURE — 70450 CT HEAD/BRAIN W/O DYE: CPT | Mod: MC

## 2025-02-24 PROCEDURE — 70450 CT HEAD/BRAIN W/O DYE: CPT | Mod: 26

## 2025-02-24 PROCEDURE — 85025 COMPLETE CBC W/AUTO DIFF WBC: CPT

## 2025-02-24 PROCEDURE — 99284 EMERGENCY DEPT VISIT MOD MDM: CPT | Mod: 25

## 2025-02-24 PROCEDURE — 83735 ASSAY OF MAGNESIUM: CPT

## 2025-02-24 PROCEDURE — 84443 ASSAY THYROID STIM HORMONE: CPT

## 2025-02-24 PROCEDURE — 84100 ASSAY OF PHOSPHORUS: CPT

## 2025-02-24 PROCEDURE — 99284 EMERGENCY DEPT VISIT MOD MDM: CPT

## 2025-02-24 PROCEDURE — 80053 COMPREHEN METABOLIC PANEL: CPT

## 2025-02-24 NOTE — ED PROVIDER NOTE - NSFOLLOWUPINSTRUCTIONS_ED_ALL_ED_FT
- You were seen in the emergency department today for insomnia.    - Lab and imaging results, if performed, were discussed with you along with your discharge diagnosis.    - Follow up with sleep doctor we will provide you with a number to call.    - Return to the ED for any new, worsening, or concerning symptoms to you.    - Continue all prescribed medications.    - Take ibuprofen/tylenol as directed as needed for pain.     - Rest and keep yourself hydrated with fluids.

## 2025-02-24 NOTE — ED PROVIDER NOTE - CLINICAL SUMMARY MEDICAL DECISION MAKING FREE TEXT BOX
RGUJRAL 69-year-old female presents with difficulty sleeping for 2 weeks.  Patient states she has had symptoms intermittently since COVID and unclear what is causing her symptoms.  Patient has followed up with her primary care doctor at [place] who referred her to a psychiatrist and has tried different medications including melatonin trazodone and sertraline Triazolone without any relief.  Patient states previously trazodone was helping but since 2 weeks has not been helping.  Patient denies any hallucinations or suicidality. Denies any depression. No infection fevers chills nausea vomiting diarrhea.  Denies any stressors and feels safe at home.  Denies any alcohol use or smoking or drug abuse.  Patient came in today for referral for possible sleep study and sleep medicine.  On exam, Patient is awake,alert,oriented x 3. Patient is well appearing and in no acute distress. Patient's chest is clear to ausculation, +s1s2. Abdomen is soft nd/nt +BS. Extremity with no swelling or calf tenderness. Pt is neuro intact. Patient is concerned with her symptoms unclear etiology we will obtain a CT head some basic labs.  Patient does not want to be referred to another PCP or psychiatrist and only wants sleep medicine referral.  Follow-up results and monitor.

## 2025-02-24 NOTE — ED PROVIDER NOTE - NSFOLLOWUPCLINICS_GEN_ALL_ED_FT
[facility] Pulmonolgy and Sleep Medicine  Pulmonology  [address] [place] [address], [place] [address]  Phone: [phone]  Fax:

## 2025-02-24 NOTE — ED PROVIDER NOTE - OBJECTIVE STATEMENT
69-year-old history of insomnia with a chief complaint of difficulty sleeping for the past 2 weeks.  He states that for the past 2 weeks she has not been able to get more than 1 hour of sleep in the night.  Patient has been on multiple medications which has been prescribed by her psychiatrist which has not helped.  Patient has taken zolpidem, quetiapine, trazodone.  Patient denies psychiatric history.  Patient denies anxiety symptoms, depressive symptoms, SI, HI.  Patient should not denying other symptoms of recent illness, chest pain, shortness of breath, urinary symptoms unless, abdominal pain

## 2025-02-24 NOTE — ED PROVIDER NOTE - PATIENT PORTAL LINK FT
You can access the FollowMyHealth Patient Portal offered by [facility] by registering at the following website: [email or web address]. By joining [employer]’s FollowMyHealth portal, you will also be able to view your health information using other applications (apps) compatible with our system.

## 2025-02-24 NOTE — ED ADULT NURSE NOTE - OBJECTIVE STATEMENT
70YO female with comes to the ED with c/o difficulty sleeping. Pt endorses difficulty sleeping- pt was referred to see a psychiatrist was prescribed medication that worked briefly but now is having difficulty sleeping again. pt is A&Ox4 respirations are even and nonlabored, boyfriend at bedside. Pt denies HI/ SI.

## 2025-02-24 NOTE — ED ADULT NURSE NOTE - NSFALLRISKASMTTYPE_ED_ALL_ED
Care Everywhere:   Immunization: updated  Health Maintenance: updated  Media Review: review for outside colon cancer report   Legacy Review:   Order placed:   Upcoming appts:         Initial (On Arrival)

## 2025-02-25 LAB — TSH SERPL-MCNC: 1.33 UIU/ML — SIGNIFICANT CHANGE UP (ref 0.27–4.2)

## 2025-02-28 ENCOUNTER — NON-APPOINTMENT (OUTPATIENT)
Age: 70
End: 2025-02-28

## 2025-03-05 ENCOUNTER — APPOINTMENT (OUTPATIENT)
Dept: PULMONOLOGY | Facility: CLINIC | Age: 70
End: 2025-03-05
Payer: MEDICARE

## 2025-03-05 VITALS
WEIGHT: 172 LBS | BODY MASS INDEX: 27 KG/M2 | OXYGEN SATURATION: 93 % | HEART RATE: 66 BPM | DIASTOLIC BLOOD PRESSURE: 79 MMHG | RESPIRATION RATE: 17 BRPM | SYSTOLIC BLOOD PRESSURE: 120 MMHG | HEIGHT: 67 IN

## 2025-03-05 DIAGNOSIS — F51.04 PSYCHOPHYSIOLOGIC INSOMNIA: ICD-10-CM

## 2025-03-05 PROCEDURE — 99205 OFFICE O/P NEW HI 60 MIN: CPT

## 2025-03-05 RX ORDER — TRAZODONE HYDROCHLORIDE 50 MG/1
50 TABLET ORAL AT BEDTIME
Qty: 60 | Refills: 0 | Status: ACTIVE | COMMUNITY
Start: 2025-03-05 | End: 1900-01-01

## 2025-03-06 PROBLEM — F51.04 CHRONIC INSOMNIA: Status: ACTIVE | Noted: 2025-03-05

## 2025-03-26 ENCOUNTER — APPOINTMENT (OUTPATIENT)
Dept: PULMONOLOGY | Facility: CLINIC | Age: 70
End: 2025-03-26